# Patient Record
Sex: MALE | Race: WHITE | NOT HISPANIC OR LATINO | Employment: FULL TIME | URBAN - METROPOLITAN AREA
[De-identification: names, ages, dates, MRNs, and addresses within clinical notes are randomized per-mention and may not be internally consistent; named-entity substitution may affect disease eponyms.]

---

## 2021-04-08 DIAGNOSIS — Z23 ENCOUNTER FOR IMMUNIZATION: ICD-10-CM

## 2021-10-28 ENCOUNTER — OFFICE VISIT (OUTPATIENT)
Dept: FAMILY MEDICINE CLINIC | Facility: CLINIC | Age: 36
End: 2021-10-28
Payer: COMMERCIAL

## 2021-10-28 VITALS
SYSTOLIC BLOOD PRESSURE: 140 MMHG | DIASTOLIC BLOOD PRESSURE: 80 MMHG | HEART RATE: 81 BPM | TEMPERATURE: 98.7 F | BODY MASS INDEX: 37.47 KG/M2 | OXYGEN SATURATION: 96 % | HEIGHT: 69 IN | WEIGHT: 253 LBS

## 2021-10-28 DIAGNOSIS — I10 PRIMARY HYPERTENSION: ICD-10-CM

## 2021-10-28 DIAGNOSIS — Z23 NEED FOR INFLUENZA VACCINATION: ICD-10-CM

## 2021-10-28 DIAGNOSIS — Z00.00 ANNUAL PHYSICAL EXAM: Primary | ICD-10-CM

## 2021-10-28 PROCEDURE — 1036F TOBACCO NON-USER: CPT | Performed by: FAMILY MEDICINE

## 2021-10-28 PROCEDURE — 90471 IMMUNIZATION ADMIN: CPT

## 2021-10-28 PROCEDURE — 3725F SCREEN DEPRESSION PERFORMED: CPT | Performed by: FAMILY MEDICINE

## 2021-10-28 PROCEDURE — 90686 IIV4 VACC NO PRSV 0.5 ML IM: CPT

## 2021-10-28 PROCEDURE — 3008F BODY MASS INDEX DOCD: CPT | Performed by: FAMILY MEDICINE

## 2021-10-28 PROCEDURE — 99385 PREV VISIT NEW AGE 18-39: CPT | Performed by: FAMILY MEDICINE

## 2021-10-28 RX ORDER — FLUTICASONE PROPIONATE 50 MCG
1 SPRAY, SUSPENSION (ML) NASAL AS NEEDED
COMMUNITY

## 2021-10-28 RX ORDER — QUINAPRIL 20 MG/1
20 TABLET ORAL DAILY
COMMUNITY
Start: 2021-08-12 | End: 2021-10-28

## 2021-10-28 RX ORDER — OLMESARTAN MEDOXOMIL 20 MG/1
20 TABLET ORAL DAILY
Qty: 90 TABLET | Refills: 0 | Status: SHIPPED | OUTPATIENT
Start: 2021-10-28 | End: 2022-01-16

## 2021-11-05 ENCOUNTER — TELEPHONE (OUTPATIENT)
Dept: OTHER | Facility: OTHER | Age: 36
End: 2021-11-05

## 2021-11-17 ENCOUNTER — HOSPITAL ENCOUNTER (OUTPATIENT)
Dept: RADIOLOGY | Facility: HOSPITAL | Age: 36
Discharge: HOME | End: 2021-11-17
Payer: COMMERCIAL

## 2021-11-17 DIAGNOSIS — R74.8 ELEVATED LIVER ENZYMES: ICD-10-CM

## 2021-11-17 PROCEDURE — 76700 US EXAM ABDOM COMPLETE: CPT

## 2021-12-22 ENCOUNTER — IMMUNIZATIONS (OUTPATIENT)
Dept: FAMILY MEDICINE CLINIC | Facility: HOSPITAL | Age: 36
End: 2021-12-22

## 2021-12-22 DIAGNOSIS — Z23 ENCOUNTER FOR IMMUNIZATION: Primary | ICD-10-CM

## 2021-12-22 PROCEDURE — 0064A COVID-19 MODERNA VACC 0.25 ML BOOSTER: CPT

## 2021-12-22 PROCEDURE — 91306 COVID-19 MODERNA VACC 0.25 ML BOOSTER: CPT

## 2022-01-15 DIAGNOSIS — I10 PRIMARY HYPERTENSION: ICD-10-CM

## 2022-01-16 RX ORDER — OLMESARTAN MEDOXOMIL 20 MG/1
TABLET ORAL
Qty: 90 TABLET | Refills: 0 | Status: SHIPPED | OUTPATIENT
Start: 2022-01-16 | End: 2022-04-09

## 2022-01-25 ENCOUNTER — RA CDI HCC (OUTPATIENT)
Dept: OTHER | Facility: HOSPITAL | Age: 37
End: 2022-01-25

## 2022-01-25 NOTE — PROGRESS NOTES
[address]  coding opportunities      Chart Reviewed * (Number of) Inbasket suggestions sent to Provider: 1     Problem listed updated  Provider Accepted, (number of) suggestions accepted: 1            Number of suggestions NOT actually used: 1     Patients insurance company: [employer] (Medicare Advantage and Commercial)     Visit status: Patient arrived for their scheduled appointment        [address]  coding opportunities      Chart Reviewed * (Number of) Inbasket suggestions sent to Provider: 1     Problem listed updated  Provider Accepted, (number of) suggestions accepted: 1            Patients insurance company: [employer] (Medicare Advantage and Commercial)       [facility] [address]  coding opportunities      Chart Reviewed * (Number of) Inbasket suggestions sent to Provider: 1      Patients insurance company: [employer] (Medicare Advantage and Commercial)       Appt on 1/28/22    Please review if this is correct and assess and document if applicable       E66 01: Morbid (severe) obesity due to excess calories (HCC)     Per CMS/ICD 10 coding guidelines, to be used when BMI > 35 & <40 with one or more comorbidity (DM, HTN, or Obstructive Sleep Apnea)

## 2022-01-26 ENCOUNTER — APPOINTMENT (OUTPATIENT)
Dept: LAB | Age: 37
End: 2022-01-26
Payer: COMMERCIAL

## 2022-01-26 DIAGNOSIS — I10 PRIMARY HYPERTENSION: ICD-10-CM

## 2022-01-26 DIAGNOSIS — Z00.00 ANNUAL PHYSICAL EXAM: ICD-10-CM

## 2022-01-26 LAB
ALBUMIN SERPL BCP-MCNC: 4.2 G/DL (ref 3.5–5)
ALP SERPL-CCNC: 103 U/L (ref 46–116)
ALT SERPL W P-5'-P-CCNC: 90 U/L (ref 12–78)
ANION GAP SERPL CALCULATED.3IONS-SCNC: 3 MMOL/L (ref 4–13)
AST SERPL W P-5'-P-CCNC: 41 U/L (ref 5–45)
BASOPHILS # BLD AUTO: 0.05 THOUSANDS/ΜL (ref 0–0.1)
BASOPHILS NFR BLD AUTO: 1 % (ref 0–1)
BILIRUB SERPL-MCNC: 0.68 MG/DL (ref 0.2–1)
BUN SERPL-MCNC: 19 MG/DL (ref 5–25)
CALCIUM SERPL-MCNC: 9.7 MG/DL (ref 8.3–10.1)
CHLORIDE SERPL-SCNC: 108 MMOL/L (ref 100–108)
CHOLEST SERPL-MCNC: 160 MG/DL
CO2 SERPL-SCNC: 27 MMOL/L (ref 21–32)
CREAT SERPL-MCNC: 1.14 MG/DL (ref 0.6–1.3)
EOSINOPHIL # BLD AUTO: 0.02 THOUSAND/ΜL (ref 0–0.61)
EOSINOPHIL NFR BLD AUTO: 0 % (ref 0–6)
ERYTHROCYTE [DISTWIDTH] IN BLOOD BY AUTOMATED COUNT: 12.7 % (ref 11.6–15.1)
GFR SERPL CREATININE-BSD FRML MDRD: 83 ML/MIN/1.73SQ M
GLUCOSE P FAST SERPL-MCNC: 119 MG/DL (ref 65–99)
HCT VFR BLD AUTO: 44.9 % (ref 36.5–49.3)
HDLC SERPL-MCNC: 34 MG/DL
HGB BLD-MCNC: 15.5 G/DL (ref 12–17)
IMM GRANULOCYTES # BLD AUTO: 0.01 THOUSAND/UL (ref 0–0.2)
IMM GRANULOCYTES NFR BLD AUTO: 0 % (ref 0–2)
LDLC SERPL CALC-MCNC: 104 MG/DL (ref 0–100)
LYMPHOCYTES # BLD AUTO: 2.02 THOUSANDS/ΜL (ref 0.6–4.47)
LYMPHOCYTES NFR BLD AUTO: 33 % (ref 14–44)
MCH RBC QN AUTO: 30 PG (ref 26.8–34.3)
MCHC RBC AUTO-ENTMCNC: 34.5 G/DL (ref 31.4–37.4)
MCV RBC AUTO: 87 FL (ref 82–98)
MONOCYTES # BLD AUTO: 0.76 THOUSAND/ΜL (ref 0.17–1.22)
MONOCYTES NFR BLD AUTO: 13 % (ref 4–12)
NEUTROPHILS # BLD AUTO: 3.18 THOUSANDS/ΜL (ref 1.85–7.62)
NEUTS SEG NFR BLD AUTO: 53 % (ref 43–75)
NRBC BLD AUTO-RTO: 0 /100 WBCS
PLATELET # BLD AUTO: 269 THOUSANDS/UL (ref 149–390)
PMV BLD AUTO: 9.1 FL (ref 8.9–12.7)
POTASSIUM SERPL-SCNC: 4.7 MMOL/L (ref 3.5–5.3)
PROT SERPL-MCNC: 7.7 G/DL (ref 6.4–8.2)
RBC # BLD AUTO: 5.16 MILLION/UL (ref 3.88–5.62)
SODIUM SERPL-SCNC: 138 MMOL/L (ref 136–145)
TRIGL SERPL-MCNC: 109 MG/DL
WBC # BLD AUTO: 6.04 THOUSAND/UL (ref 4.31–10.16)

## 2022-01-26 PROCEDURE — 85025 COMPLETE CBC W/AUTO DIFF WBC: CPT | Performed by: FAMILY MEDICINE

## 2022-01-26 PROCEDURE — 36415 COLL VENOUS BLD VENIPUNCTURE: CPT | Performed by: FAMILY MEDICINE

## 2022-01-26 PROCEDURE — 80061 LIPID PANEL: CPT

## 2022-01-26 PROCEDURE — 87389 HIV-1 AG W/HIV-1&-2 AB AG IA: CPT

## 2022-01-26 PROCEDURE — 80053 COMPREHEN METABOLIC PANEL: CPT | Performed by: FAMILY MEDICINE

## 2022-01-27 LAB — HIV 1+2 AB+HIV1 P24 AG SERPL QL IA: NORMAL

## 2022-01-28 ENCOUNTER — OFFICE VISIT (OUTPATIENT)
Dept: FAMILY MEDICINE CLINIC | Facility: CLINIC | Age: 37
End: 2022-01-28
Payer: COMMERCIAL

## 2022-01-28 VITALS
RESPIRATION RATE: 16 BRPM | HEART RATE: 88 BPM | OXYGEN SATURATION: 97 % | SYSTOLIC BLOOD PRESSURE: 118 MMHG | BODY MASS INDEX: 37.98 KG/M2 | WEIGHT: 256.4 LBS | TEMPERATURE: 97.2 F | DIASTOLIC BLOOD PRESSURE: 80 MMHG | HEIGHT: 69 IN

## 2022-01-28 DIAGNOSIS — I10 PRIMARY HYPERTENSION: Primary | ICD-10-CM

## 2022-01-28 DIAGNOSIS — J30.2 SEASONAL ALLERGIES: ICD-10-CM

## 2022-01-28 DIAGNOSIS — E66.9 OBESITY (BMI 30-39.9): ICD-10-CM

## 2022-01-28 PROBLEM — E66.01 MORBID (SEVERE) OBESITY DUE TO EXCESS CALORIES (HCC): Status: ACTIVE | Noted: 2022-01-28

## 2022-01-28 PROCEDURE — 3725F SCREEN DEPRESSION PERFORMED: CPT | Performed by: FAMILY MEDICINE

## 2022-01-28 PROCEDURE — 3008F BODY MASS INDEX DOCD: CPT | Performed by: FAMILY MEDICINE

## 2022-01-28 PROCEDURE — 3079F DIAST BP 80-89 MM HG: CPT | Performed by: FAMILY MEDICINE

## 2022-01-28 PROCEDURE — 99214 OFFICE O/P EST MOD 30 MIN: CPT | Performed by: FAMILY MEDICINE

## 2022-01-28 PROCEDURE — 3074F SYST BP LT 130 MM HG: CPT | Performed by: FAMILY MEDICINE

## 2022-01-28 PROCEDURE — 1036F TOBACCO NON-USER: CPT | Performed by: FAMILY MEDICINE

## 2022-01-28 NOTE — PROGRESS NOTES
Assessment/Plan:    Seasonal allergies  Stable on flonase PRN     Primary hypertension  Doing well on benicar  Continue as directed    Obesity (BMI 30-39  9)  Diet and exercise discussed  5 pounds weight loss goal in 4 months       Diagnoses and all orders for this visit:    Primary hypertension  -     Comprehensive metabolic panel    Obesity (BMI 30-39  9)    Seasonal allergies          Subjective:      Patient ID: Jacqlyn Juarez is a 31 y o  male  Here for follow up  Gained few pounds    Breakfast: bagel, cottage cheese  Lunch: sandwich - ham, turkey, cheese   Dinner: meat and vegetables      Hypertension  This is a chronic problem  The current episode started more than 1 year ago  The problem has been gradually improving since onset  The problem is controlled  Pertinent negatives include no anxiety, blurred vision, chest pain, headaches, malaise/fatigue, neck pain, orthopnea, palpitations, peripheral edema, PND, shortness of breath or sweats  Past treatments include angiotensin blockers  The current treatment provides moderate improvement  There are no compliance problems  There is no history of angina, kidney disease, CAD/MI, CVA, heart failure, left ventricular hypertrophy, PVD or retinopathy  There is no history of chronic renal disease, coarctation of the aorta, hyperaldosteronism, hypercortisolism, hyperparathyroidism, a hypertension causing med, pheochromocytoma, renovascular disease, sleep apnea or a thyroid problem  The following portions of the patient's history were reviewed and updated as appropriate: allergies, current medications, past family history, past medical history, past social history, past surgical history and problem list     Review of Systems   Constitutional: Negative  Negative for malaise/fatigue  HENT: Negative  Eyes: Negative  Negative for blurred vision  Respiratory: Negative  Negative for shortness of breath      Cardiovascular: Negative for chest pain, palpitations, orthopnea and PND  Genitourinary: Negative  Musculoskeletal: Negative  Negative for neck pain  Neurological: Negative  Negative for headaches  Hematological: Negative  Objective:      /80 (BP Location: Left arm, Patient Position: Sitting, Cuff Size: Large)   Pulse 88   Temp (!) 97 2 °F (36 2 °C) (Tympanic)   Resp 16   Ht 5' 8 5" (1 74 m)   Wt 116 kg (256 lb 6 4 oz)   SpO2 97%   BMI 38 42 kg/m²          Physical Exam  Vitals reviewed  Constitutional:       Appearance: Normal appearance  Cardiovascular:      Rate and Rhythm: Normal rate  Pulses: Normal pulses  Heart sounds: Normal heart sounds  Neurological:      General: No focal deficit present  Mental Status: He is alert and oriented to person, place, and time     Psychiatric:         Mood and Affect: Mood normal          Behavior: Behavior normal

## 2022-04-09 DIAGNOSIS — I10 PRIMARY HYPERTENSION: ICD-10-CM

## 2022-04-09 RX ORDER — OLMESARTAN MEDOXOMIL 20 MG/1
TABLET ORAL
Qty: 90 TABLET | Refills: 0 | Status: SHIPPED | OUTPATIENT
Start: 2022-04-09 | End: 2022-07-12 | Stop reason: SDUPTHER

## 2022-06-06 ENCOUNTER — OFFICE VISIT (OUTPATIENT)
Dept: FAMILY MEDICINE CLINIC | Facility: CLINIC | Age: 37
End: 2022-06-06
Payer: COMMERCIAL

## 2022-06-06 VITALS
DIASTOLIC BLOOD PRESSURE: 74 MMHG | HEART RATE: 80 BPM | HEIGHT: 69 IN | RESPIRATION RATE: 16 BRPM | WEIGHT: 251.6 LBS | SYSTOLIC BLOOD PRESSURE: 118 MMHG | BODY MASS INDEX: 37.26 KG/M2 | OXYGEN SATURATION: 95 % | TEMPERATURE: 98.5 F

## 2022-06-06 DIAGNOSIS — J30.2 SEASONAL ALLERGIES: ICD-10-CM

## 2022-06-06 DIAGNOSIS — Z23 NEED FOR DIPHTHERIA-TETANUS-PERTUSSIS (TDAP) VACCINE: ICD-10-CM

## 2022-06-06 DIAGNOSIS — E66.01 MORBID (SEVERE) OBESITY DUE TO EXCESS CALORIES (HCC): Primary | ICD-10-CM

## 2022-06-06 DIAGNOSIS — R74.8 ELEVATED LIVER ENZYMES: ICD-10-CM

## 2022-06-06 PROCEDURE — 99214 OFFICE O/P EST MOD 30 MIN: CPT | Performed by: FAMILY MEDICINE

## 2022-06-06 PROCEDURE — 90471 IMMUNIZATION ADMIN: CPT

## 2022-06-06 PROCEDURE — 1036F TOBACCO NON-USER: CPT | Performed by: FAMILY MEDICINE

## 2022-06-06 PROCEDURE — 3074F SYST BP LT 130 MM HG: CPT | Performed by: FAMILY MEDICINE

## 2022-06-06 PROCEDURE — 90715 TDAP VACCINE 7 YRS/> IM: CPT

## 2022-06-06 PROCEDURE — 3008F BODY MASS INDEX DOCD: CPT | Performed by: FAMILY MEDICINE

## 2022-06-06 PROCEDURE — 3078F DIAST BP <80 MM HG: CPT | Performed by: FAMILY MEDICINE

## 2022-06-06 NOTE — PROGRESS NOTES
Assessment/Plan: Morbid (severe) obesity due to excess calories ([address] )  Diet and exercise encouraged    Seasonal allergies  Doing well over zyrtec as needed       Elevated liver enzymes  Reviewed [facility] on his phone today as I dont have labs available  AST is 40, ALT 65  Went down from prior labs  Weight loss encouraged to help with fatty liver shown on ultrasound     Primary hypertension  Doing well on benicar       Diagnoses and all orders for this visit:    Morbid (severe) obesity due to excess calories (HCC)    Seasonal allergies    Need for diphtheria-tetanus-pertussis (Tdap) vaccine  -     TDAP VACCINE GREATER THAN OR EQUAL TO 6YO IM    Elevated liver enzymes        Subjective:      Patient ID: Ramon Contreras is a 31 y o  male  Had blood work few days ago    Hypertension  This is a chronic problem  The current episode started more than 1 year ago  The problem is controlled  Pertinent negatives include no anxiety, blurred vision, chest pain, headaches, malaise/fatigue, neck pain, orthopnea, palpitations, peripheral edema, PND, shortness of breath or sweats  The current treatment provides mild improvement  The following portions of the patient's history were reviewed and updated as appropriate: allergies, current medications, past family history, past medical history, past social history, past surgical history and problem list     Review of Systems   Constitutional: Negative for malaise/fatigue  Eyes: Negative for blurred vision  Respiratory: Negative for shortness of breath  Cardiovascular: Negative for chest pain, palpitations, orthopnea and PND  Musculoskeletal: Negative for neck pain  Neurological: Negative for headaches           Objective:      /74 (BP Location: Left arm, Patient Position: Sitting, Cuff Size: Large)   Pulse 80   Temp 98 5 °F (36 9 °C) (Tympanic)   Resp 16   Ht 5' 8 5" (1 74 m)   Wt 114 kg (251 lb 9 6 oz)   SpO2 95%   BMI 37 70 kg/m²          Physical Exam  Constitutional:       Appearance: Normal appearance  Cardiovascular:      Rate and Rhythm: Normal rate and regular rhythm  Pulses: Normal pulses  Heart sounds: Normal heart sounds  Pulmonary:      Effort: Pulmonary effort is normal       Breath sounds: Normal breath sounds  Neurological:      Mental Status: He is alert  BMI Counseling: Body mass index is 37 7 kg/m²  The BMI is above normal  Nutrition recommendations include decreasing portion sizes and encouraging healthy choices of fruits and vegetables  Exercise recommendations include moderate physical activity 150 minutes/week  No pharmacotherapy was ordered  Rationale for BMI follow-up plan is due to patient being overweight or obese

## 2022-06-06 NOTE — ASSESSMENT & PLAN NOTE
Reviewed [facility] on his phone today as I dont have labs available  AST is 40, ALT 65  Went down from prior labs  Weight loss encouraged to help with fatty liver shown on ultrasound

## 2022-10-21 DIAGNOSIS — I10 PRIMARY HYPERTENSION: ICD-10-CM

## 2022-10-21 DIAGNOSIS — R74.8 ELEVATED LIVER ENZYMES: Primary | ICD-10-CM

## 2022-10-31 ENCOUNTER — OFFICE VISIT (OUTPATIENT)
Dept: FAMILY MEDICINE CLINIC | Facility: CLINIC | Age: 37
End: 2022-10-31

## 2022-10-31 VITALS
TEMPERATURE: 98.4 F | BODY MASS INDEX: 38.52 KG/M2 | WEIGHT: 254.2 LBS | DIASTOLIC BLOOD PRESSURE: 74 MMHG | HEART RATE: 74 BPM | RESPIRATION RATE: 16 BRPM | OXYGEN SATURATION: 96 % | HEIGHT: 68 IN | SYSTOLIC BLOOD PRESSURE: 112 MMHG

## 2022-10-31 DIAGNOSIS — R74.8 ELEVATED LIVER ENZYMES: ICD-10-CM

## 2022-10-31 DIAGNOSIS — I10 PRIMARY HYPERTENSION: ICD-10-CM

## 2022-10-31 DIAGNOSIS — Z00.00 ANNUAL PHYSICAL EXAM: Primary | ICD-10-CM

## 2022-10-31 DIAGNOSIS — E66.01 MORBID (SEVERE) OBESITY DUE TO EXCESS CALORIES (HCC): ICD-10-CM

## 2022-10-31 DIAGNOSIS — Z23 ENCOUNTER FOR IMMUNIZATION: ICD-10-CM

## 2022-10-31 NOTE — PROGRESS NOTES
[facility] PRACTICE    NAME: Collette Zavala  AGE: 30 y o  SEX: male  : 1985     DATE: 10/31/2022     Assessment and Plan:     Problem List Items Addressed This Visit        Cardiovascular and Mediastinum    Primary hypertension     benicar 20 mg daily is helping             Other    Morbid (severe) obesity due to excess calories ([address] )     Weight loss management program discussed today   Patient is not interested yet   Spoke about saxenda injections as well and patient will check with his insurance         Elevated liver enzymes     Recent AST and ALT were still elevated done with [facility]- reviewed on his phone as results werent faxed to me yet  Weight loss will help with fatty liver           Other Visit Diagnoses     Annual physical exam    -  Primary    Encounter for immunization        Relevant Orders    influenza vaccine, quadrivalent, 0 5 mL, preservative-free, for adult and pediatric patients 6 mos+ (AFLURIA, FLUARIX, FLULAVAL, FLUZONE) (Completed)          Immunizations and preventive care screenings were discussed with patient today  Appropriate education was printed on patient's after visit summary  Counseling:  • Alcohol/drug use: discussed moderation in alcohol intake, the recommendations for healthy alcohol use, and avoidance of illicit drug use  • Dental Health: discussed importance of regular tooth brushing, flossing, and dental visits  • Injury prevention: discussed safety/seat belts, safety helmets, smoke detectors, carbon dioxide detectors, and smoking near bedding or upholstery  • Sexual health: discussed sexually transmitted diseases, partner selection, use of condoms, avoidance of unintended pregnancy, and contraceptive alternatives  · Exercise: the importance of regular exercise/physical activity was discussed  Recommend exercise 3-5 times per week for at least 30 minutes         Depression Screening and Follow-up Plan: Patient was screened for depression during today's encounter  They screened negative with a PHQ-2 score of 0  No follow-ups on file  Chief Complaint:     Chief Complaint   Patient presents with   • Physical Exam     Patient is here today for an annual exam       History of Present Illness:     Adult Annual Physical   Patient here for a comprehensive physical exam  The patient reports no problems  Diet and Physical Activity  · Diet/Nutrition: well balanced diet and consuming 3-5 servings of fruits/vegetables daily  · Exercise: walking  Depression Screening  PHQ-2/9 Depression Screening    Little interest or pleasure in doing things: 0 - not at all  Feeling down, depressed, or hopeless: 0 - not at all  PHQ-2 Score: 0  PHQ-2 Interpretation: Negative depression screen       General Health  · Sleep: sleeps well and gets 7-8 hours of sleep on average  · Hearing: normal - bilateral   · Vision: goes for regular eye exams  · Dental: regular dental visits and brushes teeth twice daily   Health  · History of STDs?: no      Review of Systems:     Review of Systems   Constitutional: Negative  HENT: Negative  Eyes: Negative  Respiratory: Negative  Cardiovascular: Negative  Gastrointestinal: Negative  Endocrine: Negative  Genitourinary: Negative  Musculoskeletal: Negative  Skin: Negative  Allergic/Immunologic: Negative  Neurological: Negative  Hematological: Negative  Psychiatric/Behavioral: Negative  Past Medical History:     Past Medical History:   Diagnosis Date   • Hypertension       Past Surgical History:     History reviewed  No pertinent surgical history     Social History:     Social History     Socioeconomic History   • Marital status: Single     Spouse name: None   • Number of children: None   • Years of education: None   • Highest education level: None   Occupational History   • None   Tobacco Use   • Smoking status: Never Smoker   • Smokeless tobacco: Never Used   Vaping Use   • Vaping Use: Never used   Substance and Sexual Activity   • Alcohol use: Yes   • Drug use: Never   • Sexual activity: Yes   Other Topics Concern   • None   Social History Narrative   • None     Social Determinants of Health     Financial Resource Strain: Not on file   Food Insecurity: Not on file   Transportation Needs: Not on file   Physical Activity: Not on file   Stress: Not on file   Social Connections: Not on file   Intimate Partner Violence: Not on file   Housing Stability: Not on file      Family History:     Family History   Problem Relation Age of Onset   • Hypertension Father       Current Medications:     Current Outpatient Medications   Medication Sig Dispense Refill   • fluticasone (FLONASE) 50 mcg/act nasal spray 1 spray into each nostril as needed for rhinitis     • olmesartan (BENICAR) 20 mg tablet Take 1 tablet (20 mg total) by mouth daily 90 tablet 3     No current facility-administered medications for this visit  Allergies:     No Known Allergies   Physical Exam:     /74 (BP Location: Left arm, Patient Position: Sitting, Cuff Size: Large)   Pulse 74   Temp 98 4 °F (36 9 °C) (Tympanic)   Resp 16   Ht 5' 8 47" (1 739 m)   Wt 115 kg (254 lb 3 2 oz)   SpO2 96%   BMI 38 13 kg/m²     Physical Exam  Constitutional:       Appearance: He is well-developed  HENT:      Head: Normocephalic and atraumatic  Right Ear: Tympanic membrane normal       Left Ear: Tympanic membrane normal    Eyes:      Pupils: Pupils are equal, round, and reactive to light  Cardiovascular:      Rate and Rhythm: Normal rate and regular rhythm  Pulmonary:      Effort: Pulmonary effort is normal       Breath sounds: Normal breath sounds  Abdominal:      Palpations: Abdomen is soft  Musculoskeletal:         General: Normal range of motion  Cervical back: Normal range of motion and neck supple  Skin:     General: Skin is warm        Capillary Refill: Capillary refill takes less than 2 seconds  Neurological:      Mental Status: He is alert and oriented to person, place, and time     Psychiatric:         Behavior: Behavior normal           Tyler Yadav MD   [facility]

## 2022-10-31 NOTE — PATIENT INSTRUCTIONS

## 2022-10-31 NOTE — ASSESSMENT & PLAN NOTE
Recent AST and ALT were still elevated done with [facility]- reviewed on his phone as results werent faxed to me yet  Weight loss will help with fatty liver

## 2022-10-31 NOTE — ASSESSMENT & PLAN NOTE
Weight loss management program discussed today   Patient is not interested yet   Spoke about saxenda injections as well and patient will check with his insurance

## 2022-11-03 ENCOUNTER — TELEPHONE (OUTPATIENT)
Dept: FAMILY MEDICINE CLINIC | Facility: CLINIC | Age: 37
End: 2022-11-03

## 2022-11-03 NOTE — TELEPHONE ENCOUNTER
Lm about labs at [address] [place] [address] and results not coming into chart  Should be resolved at this time, call the office if issue continues after next lab tests completed

## 2023-04-26 LAB
CHOLEST SERPL-MCNC: 150 MG/DL (ref 100–199)
HDLC SERPL-MCNC: 33 MG/DL
LDLC SERPL CALC-MCNC: 90 MG/DL (ref 0–99)
SL AMB VLDL CHOLESTEROL CALC: 27 MG/DL (ref 5–40)
TRIGL SERPL-MCNC: 150 MG/DL (ref 0–149)

## 2023-04-27 ENCOUNTER — OFFICE VISIT (OUTPATIENT)
Dept: FAMILY MEDICINE CLINIC | Facility: CLINIC | Age: 38
End: 2023-04-27

## 2023-04-27 VITALS
BODY MASS INDEX: 38.34 KG/M2 | WEIGHT: 253 LBS | SYSTOLIC BLOOD PRESSURE: 112 MMHG | OXYGEN SATURATION: 98 % | HEART RATE: 80 BPM | HEIGHT: 68 IN | TEMPERATURE: 97.3 F | DIASTOLIC BLOOD PRESSURE: 78 MMHG

## 2023-04-27 DIAGNOSIS — I10 PRIMARY HYPERTENSION: ICD-10-CM

## 2023-04-27 DIAGNOSIS — J30.2 SEASONAL ALLERGIES: ICD-10-CM

## 2023-04-27 DIAGNOSIS — R74.8 ELEVATED LIVER ENZYMES: ICD-10-CM

## 2023-04-27 DIAGNOSIS — E66.01 MORBID (SEVERE) OBESITY DUE TO EXCESS CALORIES (HCC): Primary | ICD-10-CM

## 2023-04-27 RX ORDER — OLMESARTAN MEDOXOMIL 20 MG/1
20 TABLET ORAL DAILY
Qty: 90 TABLET | Refills: 3 | Status: SHIPPED | OUTPATIENT
Start: 2023-04-27

## 2023-04-27 NOTE — PROGRESS NOTES
"Name: Danilo Galloway      : 1985      MRN: [id number]  Encounter Provider: Vidya Palafox MD  Encounter Date: 2023   Encounter department: [facility]     1  Morbid (severe) obesity due to excess calories [facility])  Assessment & Plan:  Diet, exercise  My fitness pal  Calories goal of 1800 milady/day  Recheck in 6 months    Orders:  -     Ambulatory Referral to Nutrition Services; Future  -     Ambulatory Referral to Nutrition Services; Future    2  Elevated liver enzymes  Assessment & Plan:  Due for labs       3  Seasonal allergies  Assessment & Plan:  No need for meds this year yet        4  Primary hypertension  Assessment & Plan:  Doing well on current meds    Orders:  -     olmesartan (BENICAR) 20 mg tablet; Take 1 tablet (20 mg total) by mouth daily  -     CBC and differential  -     Comprehensive metabolic panel  -     Lipid panel           Subjective      HPI   Here for follow up  Feeling well overall  Not watching his diet  Weigh the same as last visit      Review of Systems   Constitutional: Negative  HENT: Negative  Eyes: Negative  Respiratory: Negative  Endocrine: Negative  Genitourinary: Negative  Musculoskeletal: Negative  Allergic/Immunologic: Negative  Neurological: Negative  Hematological: Negative  Psychiatric/Behavioral: Negative  Current Outpatient Medications on File Prior to Visit   Medication Sig   • [DISCONTINUED] olmesartan (BENICAR) 20 mg tablet Take 1 tablet (20 mg total) by mouth daily   • [DISCONTINUED] fluticasone (FLONASE) 50 mcg/act nasal spray 1 spray into each nostril as needed for rhinitis       Objective     /78 (BP Location: Left arm, Patient Position: Sitting, Cuff Size: Large)   Pulse 80   Temp (!) 97 3 °F (36 3 °C) (Temporal)   Ht 5' 8\" (1 727 m)   Wt 115 kg (253 lb)   SpO2 98%   BMI 38 47 kg/m²     Physical Exam  Constitutional:       Appearance: Normal appearance     HENT:      Right " Ear: Tympanic membrane normal       Left Ear: Tympanic membrane normal    Pulmonary:      Effort: Pulmonary effort is normal       Breath sounds: Normal breath sounds  Neurological:      General: No focal deficit present  Mental Status: He is alert and oriented to person, place, and time     Psychiatric:         Mood and Affect: Mood normal          Behavior: Behavior normal        Rey Singh MD

## 2023-11-01 ENCOUNTER — OFFICE VISIT (OUTPATIENT)
Dept: FAMILY MEDICINE CLINIC | Facility: CLINIC | Age: 38
End: 2023-11-01
Payer: COMMERCIAL

## 2023-11-01 VITALS
OXYGEN SATURATION: 95 % | HEIGHT: 69 IN | TEMPERATURE: 97.3 F | DIASTOLIC BLOOD PRESSURE: 86 MMHG | SYSTOLIC BLOOD PRESSURE: 122 MMHG | BODY MASS INDEX: 36.88 KG/M2 | RESPIRATION RATE: 17 BRPM | HEART RATE: 72 BPM | WEIGHT: 249 LBS

## 2023-11-01 DIAGNOSIS — Z11.59 NEED FOR HEPATITIS C SCREENING TEST: ICD-10-CM

## 2023-11-01 DIAGNOSIS — R74.8 ELEVATED LIVER ENZYMES: ICD-10-CM

## 2023-11-01 DIAGNOSIS — K21.9 GASTROESOPHAGEAL REFLUX DISEASE WITHOUT ESOPHAGITIS: ICD-10-CM

## 2023-11-01 DIAGNOSIS — Z00.00 ANNUAL PHYSICAL EXAM: Primary | ICD-10-CM

## 2023-11-01 DIAGNOSIS — I10 PRIMARY HYPERTENSION: ICD-10-CM

## 2023-11-01 LAB
ALBUMIN SERPL-MCNC: 4.6 G/DL (ref 4.1–5.1)
ALBUMIN/GLOB SERPL: 2 {RATIO} (ref 1.2–2.2)
ALP SERPL-CCNC: 94 IU/L (ref 44–121)
ALT SERPL-CCNC: 51 IU/L (ref 0–44)
AST SERPL-CCNC: 35 IU/L (ref 0–40)
BASOPHILS # BLD AUTO: 0.1 X10E3/UL (ref 0–0.2)
BASOPHILS NFR BLD AUTO: 1 %
BILIRUB SERPL-MCNC: 0.7 MG/DL (ref 0–1.2)
BUN SERPL-MCNC: 14 MG/DL (ref 6–20)
BUN/CREAT SERPL: 13 (ref 9–20)
CALCIUM SERPL-MCNC: 9.5 MG/DL (ref 8.7–10.2)
CHLORIDE SERPL-SCNC: 101 MMOL/L (ref 96–106)
CHOLEST SERPL-MCNC: 164 MG/DL (ref 100–199)
CO2 SERPL-SCNC: 22 MMOL/L (ref 20–29)
CREAT SERPL-MCNC: 1.1 MG/DL (ref 0.76–1.27)
EGFR: 89 ML/MIN/1.73
EOSINOPHIL # BLD AUTO: 0.1 X10E3/UL (ref 0–0.4)
EOSINOPHIL NFR BLD AUTO: 2 %
ERYTHROCYTE [DISTWIDTH] IN BLOOD BY AUTOMATED COUNT: 12.5 % (ref 11.6–15.4)
GLOBULIN SER-MCNC: 2.3 G/DL (ref 1.5–4.5)
GLUCOSE SERPL-MCNC: 106 MG/DL (ref 70–99)
HCT VFR BLD AUTO: 42.7 % (ref 37.5–51)
HDLC SERPL-MCNC: 37 MG/DL
HGB BLD-MCNC: 14.9 G/DL (ref 13–17.7)
IMM GRANULOCYTES # BLD: 0 X10E3/UL (ref 0–0.1)
IMM GRANULOCYTES NFR BLD: 0 %
LDLC SERPL CALC-MCNC: 102 MG/DL (ref 0–99)
LYMPHOCYTES # BLD AUTO: 2.1 X10E3/UL (ref 0.7–3.1)
LYMPHOCYTES NFR BLD AUTO: 36 %
MCH RBC QN AUTO: 30.6 PG (ref 26.6–33)
MCHC RBC AUTO-ENTMCNC: 34.9 G/DL (ref 31.5–35.7)
MCV RBC AUTO: 88 FL (ref 79–97)
MONOCYTES # BLD AUTO: 0.7 X10E3/UL (ref 0.1–0.9)
MONOCYTES NFR BLD AUTO: 12 %
NEUTROPHILS # BLD AUTO: 2.9 X10E3/UL (ref 1.4–7)
NEUTROPHILS NFR BLD AUTO: 49 %
PLATELET # BLD AUTO: 263 X10E3/UL (ref 150–450)
POTASSIUM SERPL-SCNC: 5.1 MMOL/L (ref 3.5–5.2)
PROT SERPL-MCNC: 6.9 G/DL (ref 6–8.5)
RBC # BLD AUTO: 4.87 X10E6/UL (ref 4.14–5.8)
SL AMB VLDL CHOLESTEROL CALC: 25 MG/DL (ref 5–40)
SODIUM SERPL-SCNC: 137 MMOL/L (ref 134–144)
TRIGL SERPL-MCNC: 141 MG/DL (ref 0–149)
WBC # BLD AUTO: 5.8 X10E3/UL (ref 3.4–10.8)

## 2023-11-01 PROCEDURE — 99395 PREV VISIT EST AGE 18-39: CPT | Performed by: FAMILY MEDICINE

## 2023-11-01 NOTE — PROGRESS NOTES
[facility] PRACTICE    NAME: Dominick Wells  AGE: 41 y.o. SEX: male  : 1985     DATE: 2023     Assessment and Plan:     Problem List Items Addressed This Visit          Digestive    Gastroesophageal reflux disease without esophagitis     to try prilosec or nexium 20 mg OTC for 2 weeks  to avoid triggers  weight loss encouraged as well            Cardiovascular and Mediastinum    Primary hypertension    Relevant Orders    Comprehensive metabolic panel    CBC and differential    Lipid panel       Other    Elevated liver enzymes     Improving  Continue to lose weight and avoid tylenol containing products and alcohol          Other Visit Diagnoses       Annual physical exam    -  Primary    Relevant Orders    Lipid panel    Need for hepatitis C screening test        Relevant Orders    Hepatitis C antibody            Immunizations and preventive care screenings were discussed with patient today. Appropriate education was printed on patient's after visit summary. Counseling:  Alcohol/drug use: discussed moderation in alcohol intake, the recommendations for healthy alcohol use, and avoidance of illicit drug use. Dental Health: discussed importance of regular tooth brushing, flossing, and dental visits. Injury prevention: discussed safety/seat belts, safety helmets, smoke detectors, carbon dioxide detectors, and smoking near bedding or upholstery. Sexual health: discussed sexually transmitted diseases, partner selection, use of condoms, avoidance of unintended pregnancy, and contraceptive alternatives. Exercise: the importance of regular exercise/physical activity was discussed. Recommend exercise 3-5 times per week for at least 30 minutes. BMI Counseling: Body mass index is 36.84 kg/m². The BMI is above normal. Nutrition recommendations include decreasing portion sizes and encouraging healthy choices of fruits and vegetables. Exercise recommendations include moderate physical activity 150 minutes/week. No pharmacotherapy was ordered. Rationale for BMI follow-up plan is due to patient being overweight or obese. Depression Screening and Follow-up Plan: Patient was screened for depression during today's encounter. They screened negative with a PHQ-2 score of 0. No follow-ups on file. Chief Complaint:     Chief Complaint   Patient presents with    Physical Exam     Patient being seen for Physical Exam      History of Present Illness:     Adult Annual Physical   Patient here for a comprehensive physical exam. The patient reports c/o acid reflux symptoms for the last few weeks      Diet and Physical Activity  Diet/Nutrition: well balanced diet and consuming 3-5 servings of fruits/vegetables daily. Exercise: no formal exercise. Depression Screening  PHQ-2/9 Depression Screening    Little interest or pleasure in doing things: 0 - not at all  Feeling down, depressed, or hopeless: 0 - not at all  PHQ-2 Score: 0  PHQ-2 Interpretation: Negative depression screen       General Health  Sleep: sleeps well. Hearing: normal - bilateral.  Vision: no vision problems and goes for regular eye exams. Dental: regular dental visits and brushes teeth twice daily.  Health  History of STDs?: no.    Advanced Care Planning  Do you have an advanced directive? no  Do you have a durable medical power of ? no     Review of Systems:     Review of Systems   Constitutional: Negative. HENT: Negative. Eyes: Negative. Respiratory: Negative. Cardiovascular: Negative. Gastrointestinal: Negative. Endocrine: Negative. Genitourinary: Negative. Musculoskeletal: Negative. Skin: Negative. Allergic/Immunologic: Negative. Neurological: Negative. Hematological: Negative. Psychiatric/Behavioral: Negative.         Past Medical History:     Past Medical History:   Diagnosis Date    Hypertension       Past Surgical History:     History reviewed. No pertinent surgical history. Social History:     Social History     Socioeconomic History    Marital status: Single     Spouse name: None    Number of children: None    Years of education: None    Highest education level: None   Occupational History    None   Tobacco Use    Smoking status: Never    Smokeless tobacco: Never   Vaping Use    Vaping Use: Never used   Substance and Sexual Activity    Alcohol use: Yes     Alcohol/week: 1.0 standard drink of alcohol     Types: 1 Glasses of wine per week    Drug use: Never    Sexual activity: Yes     Partners: Female     Birth control/protection: Condom Male   Other Topics Concern    None   Social History Narrative    None     Social Determinants of Health     Financial Resource Strain: Not on file   Food Insecurity: Not on file   Transportation Needs: Not on file   Physical Activity: Not on file   Stress: Not on file   Social Connections: Not on file   Intimate Partner Violence: Not on file   Housing Stability: Not on file      Family History:     Family History   Problem Relation Age of Onset    Hypertension Mother     Hypertension Father       Current Medications:     Current Outpatient Medications   Medication Sig Dispense Refill    olmesartan (BENICAR) 20 mg tablet Take 1 tablet (20 mg total) by mouth daily 90 tablet 3     No current facility-administered medications for this visit. Allergies:     No Known Allergies   Physical Exam:     /86 (BP Location: Left arm, Patient Position: Sitting, Cuff Size: Standard)   Pulse 72   Temp (!) 97.3 °F (36.3 °C) (Tympanic)   Resp 17   Ht 5' 8.94" (1.751 m)   Wt 113 kg (249 lb)   SpO2 95%   BMI 36.84 kg/m²     Physical Exam  Vitals and nursing note reviewed. Constitutional:       Appearance: Normal appearance. He is well-developed. HENT:      Head: Normocephalic and atraumatic.       Right Ear: Tympanic membrane normal.      Left Ear: Tympanic membrane normal. Nose: Nose normal.      Mouth/Throat:      Mouth: Mucous membranes are moist.   Eyes:      Pupils: Pupils are equal, round, and reactive to light. Cardiovascular:      Rate and Rhythm: Normal rate and regular rhythm. Pulses: Normal pulses. Heart sounds: Normal heart sounds. Pulmonary:      Effort: Pulmonary effort is normal.      Breath sounds: Normal breath sounds. Abdominal:      Palpations: Abdomen is soft. Musculoskeletal:         General: Normal range of motion. Cervical back: Normal range of motion and neck supple. Skin:     General: Skin is warm. Capillary Refill: Capillary refill takes less than 2 seconds. Neurological:      General: No focal deficit present. Mental Status: He is alert and oriented to person, place, and time.    Psychiatric:         Behavior: Behavior normal.          Render MD Marlys   [facility]

## 2023-11-10 ENCOUNTER — CLINICAL SUPPORT (OUTPATIENT)
Dept: NUTRITION | Facility: HOSPITAL | Age: 38
End: 2023-11-10
Payer: COMMERCIAL

## 2023-11-10 VITALS — WEIGHT: 251.4 LBS | BODY MASS INDEX: 37.19 KG/M2

## 2023-11-10 DIAGNOSIS — E66.01 MORBID (SEVERE) OBESITY DUE TO EXCESS CALORIES (HCC): ICD-10-CM

## 2023-11-10 PROCEDURE — 97802 MEDICAL NUTRITION INDIV IN: CPT | Performed by: DIETITIAN, REGISTERED

## 2023-11-10 NOTE — PROGRESS NOTES
Nutrition Assessment Form    Patient Name: Cee Matt    YOB: 1985    Sex: Male     Assessment Date: 11/10/2023  Start Time: 8719 Stop Time: 7348 Total Minutes: 61     Data:  Present at session: self   Parent/Patient Concerns/reason for visit: "I guess I need to lose weight"   Medical Dx/Reason for Referral: obesity   Past Medical History:   Diagnosis Date    Hypertension        Current Outpatient Medications   Medication Sig Dispense Refill    olmesartan (BENICAR) 20 mg tablet Take 1 tablet (20 mg total) by mouth daily 90 tablet 3     No current facility-administered medications for this visit. Additional Meds/Supplements: N/a   Special Learning Needs/barriers to learning/any new barriers N/a   Height: HC Readings from Last 5 Encounters:   No data found for [facility].      Weight: Wt Readings from Last 10 Encounters:   11/10/23 114 kg (251 lb 6.4 oz)   11/01/23 113 kg (249 lb)   04/27/23 115 kg (253 lb)   10/31/22 115 kg (254 lb 3.2 oz)   06/06/22 114 kg (251 lb 9.6 oz)   01/28/22 116 kg (256 lb 6.4 oz)   10/28/21 115 kg (253 lb)     Estimated body mass index is 37.19 kg/m² as calculated from the following:    Height as of 11/1/23: 5' 8.94" (1.751 m). Weight as of this encounter: 114 kg (251 lb 6.4 oz). Recent Weight Change: []Yes     [x]No  Amount:       Energy Needs: 1850-2350cals 25 cals/kg - 500-1000 for 1-2#/ wk wt loss   No Known Allergies or intolerances NKFA   Social History     Substance and Sexual Activity   Alcohol Use Yes    Alcohol/week: 1.0 standard drink of alcohol    Types: 1 Glasses of wine per week     1 drink a week   Social History     Tobacco Use   Smoking Status Never   Smokeless Tobacco Never       Who shops? 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 []Yes     [x]No  When:      Why:         Diet Hx: he has office day and works from home- currently using myfitbit 2429cals -  36 oz water/ diet soda/zero sugar gatorade  Breakfast: Diet:  12(when he gets to work)  8 am on w/e when up earlier- overnight oats- approx 420cals  brekki   Lunch: 5pm  Salad with chicken- grilled in package- dressing in the kit or blue cheese -  approx [address]: 9-930-10-- chili 1.5c 3/4 c feta pasta bake 2 oz nuts          Snacks: serving of white cheddar popcorn - s/w sized bag - aldis version of smart pop  Cheddar cheese stick 1-2 daily  Or nutrigrain type bar  [place] yogurt  Baby carrots and peanut butter AM -   PM -   HS -    Other Notes/ Initial Assessment:  Anival's main goals are to lose weight and get healthy. He wants to know how many calories he should eat in a day. He lives with his girlfriend and starts work at 15. He also struggles with normal portions and JOJO might be an issue as he feels his sleep quality is not great. Discussed appropriate meals, snacks, fluids, lifestyle guidelines for health including adequate sleep (getting tested for JOJO), stress management and importance including regular physical activity into his day. Provided sample menus- 3670-6516 cals estimated intake to lose weight, wt loss tips from NCM and RD name and number for home use. Follow up not desired at this point, will contact central schedule if desired.         Updated assessment (Follow up note only):       Nutrition Diagnosis:   Overweight/obesity  related to Excess energy intake as evidenced by  BMI more than normative standard for age and sex (obesity-grade II 35-39. 9)       Any change or new dx since previous visit:     Nutrition Diagnosis:   N/a      Medical Nutrition Therapy Intervention:  [x]Individualized Meal Plan-Discussed the importance of eating 3 meals daily and not skipping, and how metabolism is affected. Also discussed adding in small snacks or 5-6 small meals daily if desired vs 3 larger ones, and appropriate options and portions. Appropriate timing of meals, including eating within 1 hr of waking and eating meals slowly 20mins/meals, minimizing mindless/boredom or habitual eating, etc was also mentioned. Discussed the plate method of portioning foods, including half a plate fruits and vegetables or a half plate all vegetables, 1/4 of the plate a lean protein source or meat, and a 1/4 of the plate being a whole grain carb- usually 1/2-1c. This should be followed for at least 2 meals of the day, but could also be followed for all 3. Fluid intake discussed and appropriate amounts and choices, 16 oz with meals and additional 32oz during the day. S/S dehydration also covered. []Understanding Lab Values   []Basic Pathophysiology of Disease []Food/Medication Interactions   []Food Diary [x]Exercise-150 mins of moderate activity weekly, discussed importance of variety of activity, including wt bearing activities 2-3x/wk x 10-15mins. Discussed importance of activity throughout the lifecycle and its impact on overall health/stress management, etc.   [x]Lifestyle/Behavior Modification Techniques-Discussed the importance of adequate sleep, and ideal sleeping conditions, including a cool temperature 64-68 degrees F, and a dark and quiet room. Also discussed the importance of a regular and consistent sleep routine, including minimizing blue light exposure an hour before sleeping. Weekend habits should include staying fairly consistent with weekday sleep habits to minimize disruption during the week.   A connection was made between getting adequate and good quality sleep and the ability to handle stress the next day, make healthy food choices, and be active. []Medication, Mechanism of Action   []Label Reading: CHO/ Na/ Fat/ other_________ []Self Blood Glucose Monitoring   [x]Weight/BMI Goals: gain/lose/maintain-Short term goal of 2-4# x 1 month or next visit []Other -           Comprehension: []Excellent  []Very Good  [x]Good  []Fair   []Poor    Receptivity: []Excellent  []Very Good  [x]Good  []Fair   []Poor    Expected Compliance: []Excellent  []Very Good  [x]Good  []Fair   []Poor        Goals (initial)/ Progress made on previous goals/new goals:   0166-5888 cals daily   2.  64 oz fluid daily minimum   3. Portions per plate method as dicussed with RD       No follow-ups on file.   Labs:  CMP  Lab Results   Component Value Date    K 5.1 10/31/2023     10/31/2023    CO2 22 10/31/2023    BUN 14 10/31/2023    CREATININE 1.10 10/31/2023    GLUF 119 (H) 01/26/2022    CALCIUM 9.7 01/26/2022    AST 35 10/31/2023    ALT 51 (H) 10/31/2023    ALKPHOS 103 01/26/2022    EGFR 89 10/31/2023       BMP  Lab Results   Component Value Date    CALCIUM 9.7 01/26/2022    K 5.1 10/31/2023    CO2 22 10/31/2023     10/31/2023    BUN 14 10/31/2023    CREATININE 1.10 10/31/2023       Lipids  No results found for: "CHOL"  Lab Results   Component Value Date    HDL 37 (L) 10/31/2023    HDL 33 (L) 04/25/2023    HDL 34 (L) 01/26/2022     Lab Results   Component Value Date    LDLCALC 102 (H) 10/31/2023    LDLCALC 90 04/25/2023    LDLCALC 104 (H) 01/26/2022     Lab Results   Component Value Date    TRIG 141 10/31/2023    TRIG 150 (H) 04/25/2023    TRIG 109 01/26/2022     No results found for: "CHOLHDL"    Hemoglobin A1C  No results found for: "HGBA1C"    Fasting Glucose  Lab Results   Component Value Date    GLUF 119 (H) 01/26/2022       Insulin     Thyroid  No results found for: "TSH", "J6NEQTG", "R5MJPWN", "THYROIDAB"    Hepatic Function Panel  Lab Results   Component Value Date    ALT 51 (H) 10/31/2023    AST 35 10/31/2023    ALKPHOS 103 01/26/2022       Celiac Disease Antibody Panel  No results found for: "ENDOMYSIAL IGA", "GLIADIN IGA", "GLIADIN IGG", "IGA", "TISSUE TRANSGLUT AB", "TTG IGA"   Iron  No results found for: "IRON", "TIBC", "FERRITIN"         Praful Lal, [address] [facility]

## 2024-01-20 LAB
LEFT EYE DIABETIC RETINOPATHY: NORMAL
RIGHT EYE DIABETIC RETINOPATHY: NORMAL
SEVERITY (EYE EXAM): NORMAL

## 2024-02-13 ENCOUNTER — TELEPHONE (OUTPATIENT)
Dept: ADMINISTRATIVE | Facility: OTHER | Age: 39
End: 2024-02-13

## 2024-02-13 NOTE — TELEPHONE ENCOUNTER
Upon review of the In Basket request and the patient's chart, initial outreach has been made via fax to facility. Please see Contacts section for details.     Thank you  Althea Cortez

## 2024-04-30 LAB
ALBUMIN SERPL-MCNC: 4.5 G/DL (ref 4.1–5.1)
ALBUMIN/GLOB SERPL: 1.8 {RATIO} (ref 1.2–2.2)
ALP SERPL-CCNC: 99 IU/L (ref 44–121)
ALT SERPL-CCNC: 44 IU/L (ref 0–44)
AST SERPL-CCNC: 34 IU/L (ref 0–40)
BASOPHILS # BLD AUTO: 0 X10E3/UL (ref 0–0.2)
BASOPHILS NFR BLD AUTO: 1 %
BILIRUB SERPL-MCNC: 0.9 MG/DL (ref 0–1.2)
BUN SERPL-MCNC: 16 MG/DL (ref 6–20)
BUN/CREAT SERPL: 15 (ref 9–20)
CALCIUM SERPL-MCNC: 9.4 MG/DL (ref 8.7–10.2)
CHLORIDE SERPL-SCNC: 102 MMOL/L (ref 96–106)
CHOLEST SERPL-MCNC: 151 MG/DL (ref 100–199)
CHOLEST/HDLC SERPL: 4.3 RATIO (ref 0–5)
CO2 SERPL-SCNC: 22 MMOL/L (ref 20–29)
CREAT SERPL-MCNC: 1.05 MG/DL (ref 0.76–1.27)
EGFR: 94 ML/MIN/1.73
EOSINOPHIL # BLD AUTO: 0.2 X10E3/UL (ref 0–0.4)
EOSINOPHIL NFR BLD AUTO: 3 %
ERYTHROCYTE [DISTWIDTH] IN BLOOD BY AUTOMATED COUNT: 13 % (ref 11.6–15.4)
GLOBULIN SER-MCNC: 2.5 G/DL (ref 1.5–4.5)
GLUCOSE SERPL-MCNC: 103 MG/DL (ref 70–99)
HCT VFR BLD AUTO: 44 % (ref 37.5–51)
HCV AB S/CO SERPL IA: NON REACTIVE
HDLC SERPL-MCNC: 35 MG/DL
HGB BLD-MCNC: 14.7 G/DL (ref 13–17.7)
IMM GRANULOCYTES # BLD: 0 X10E3/UL (ref 0–0.1)
IMM GRANULOCYTES NFR BLD: 0 %
LDLC SERPL CALC-MCNC: 95 MG/DL (ref 0–99)
LYMPHOCYTES # BLD AUTO: 2 X10E3/UL (ref 0.7–3.1)
LYMPHOCYTES NFR BLD AUTO: 36 %
MCH RBC QN AUTO: 29.7 PG (ref 26.6–33)
MCHC RBC AUTO-ENTMCNC: 33.4 G/DL (ref 31.5–35.7)
MCV RBC AUTO: 89 FL (ref 79–97)
MONOCYTES # BLD AUTO: 0.7 X10E3/UL (ref 0.1–0.9)
MONOCYTES NFR BLD AUTO: 12 %
NEUTROPHILS # BLD AUTO: 2.7 X10E3/UL (ref 1.4–7)
NEUTROPHILS NFR BLD AUTO: 48 %
PLATELET # BLD AUTO: 255 X10E3/UL (ref 150–450)
POTASSIUM SERPL-SCNC: 4.8 MMOL/L (ref 3.5–5.2)
PROT SERPL-MCNC: 7 G/DL (ref 6–8.5)
RBC # BLD AUTO: 4.95 X10E6/UL (ref 4.14–5.8)
SL AMB VLDL CHOLESTEROL CALC: 21 MG/DL (ref 5–40)
SODIUM SERPL-SCNC: 138 MMOL/L (ref 134–144)
TRIGL SERPL-MCNC: 117 MG/DL (ref 0–149)
WBC # BLD AUTO: 5.6 X10E3/UL (ref 3.4–10.8)

## 2024-05-01 ENCOUNTER — OFFICE VISIT (OUTPATIENT)
Dept: FAMILY MEDICINE CLINIC | Facility: CLINIC | Age: 39
End: 2024-05-01
Payer: COMMERCIAL

## 2024-05-01 VITALS
BODY MASS INDEX: 38.17 KG/M2 | HEART RATE: 91 BPM | SYSTOLIC BLOOD PRESSURE: 130 MMHG | WEIGHT: 257.7 LBS | RESPIRATION RATE: 16 BRPM | DIASTOLIC BLOOD PRESSURE: 84 MMHG | HEIGHT: 69 IN | TEMPERATURE: 98.4 F | OXYGEN SATURATION: 97 %

## 2024-05-01 DIAGNOSIS — E66.01 MORBID (SEVERE) OBESITY DUE TO EXCESS CALORIES (HCC): ICD-10-CM

## 2024-05-01 DIAGNOSIS — K21.9 GASTROESOPHAGEAL REFLUX DISEASE WITHOUT ESOPHAGITIS: ICD-10-CM

## 2024-05-01 DIAGNOSIS — G47.19 EXCESSIVE DAYTIME SLEEPINESS: ICD-10-CM

## 2024-05-01 DIAGNOSIS — I10 PRIMARY HYPERTENSION: Primary | ICD-10-CM

## 2024-05-01 PROBLEM — R74.8 ELEVATED LIVER ENZYMES: Status: RESOLVED | Noted: 2022-06-06 | Resolved: 2024-05-01

## 2024-05-01 PROCEDURE — 99214 OFFICE O/P EST MOD 30 MIN: CPT | Performed by: FAMILY MEDICINE

## 2024-05-01 PROCEDURE — 3075F SYST BP GE 130 - 139MM HG: CPT | Performed by: FAMILY MEDICINE

## 2024-05-01 PROCEDURE — 3725F SCREEN DEPRESSION PERFORMED: CPT | Performed by: FAMILY MEDICINE

## 2024-05-01 PROCEDURE — 3079F DIAST BP 80-89 MM HG: CPT | Performed by: FAMILY MEDICINE

## 2024-05-01 RX ORDER — OMEPRAZOLE 20 MG/1
20 CAPSULE, DELAYED RELEASE ORAL
Qty: 30 CAPSULE | Refills: 5 | Status: SHIPPED | OUTPATIENT
Start: 2024-05-01 | End: 2024-10-28

## 2024-05-01 NOTE — PROGRESS NOTES
Name: Anival Iyre      : 1985      MRN: [id number]  Encounter Provider: Corinna Novak MD  Encounter Date: 2024   Encounter department: [facility]    Assessment & Plan     1. Primary hypertension  Assessment & Plan:  Stable on current meds    Orders:  -     Comprehensive metabolic panel  -     Lipid panel  -     CBC and differential  -     CBC and differential    2. Excessive daytime sleepiness  Assessment & Plan:  + daytime sleeping and snoring for the last 6 months  To check for sleep apnea     Orders:  -     Ambulatory Referral to Sleep Medicine; Future    3. Gastroesophageal reflux disease without esophagitis  Assessment & Plan:  Still having issues few times a week   Trial of omeprazole 20 mg daily    Orders:  -     omeprazole (PriLOSEC) 20 mg delayed release capsule; Take 1 capsule (20 mg total) by mouth daily before breakfast    4. Morbid (severe) obesity due to excess calories (HCC)  Assessment & Plan:  Diet and exercise encouraged              Subjective   Here for follow up  C/o snoring . Daytime sleepiness and unrestful sleep     Hypertension  This is a chronic problem. The current episode started more than 1 year ago. The problem has been waxing and waning since onset. Pertinent negatives include no anxiety, blurred vision, chest pain, headaches, malaise/fatigue, neck pain, orthopnea, palpitations, peripheral edema, PND, shortness of breath or sweats. Risk factors for coronary artery disease include male gender and obesity. There is no history of angina, kidney disease, CAD/MI, CVA, heart failure, left ventricular hypertrophy, PVD or retinopathy.     Review of Systems   Constitutional: Negative.  Negative for malaise/fatigue.   HENT: Negative.     Eyes:  Negative for blurred vision.   Respiratory: Negative.  Negative for shortness of breath.    Cardiovascular: Negative.  Negative for chest pain, palpitations, orthopnea and PND.   Gastrointestinal: Negative.     Endocrine: Negative.    Genitourinary: Negative.    Musculoskeletal: Negative.  Negative for neck pain.   Neurological:  Negative for headaches.   Hematological: Negative.    Psychiatric/Behavioral: Negative.         Past Medical History:   Diagnosis Date   • Elevated liver enzymes 06/06/2022   • Hypertension      History reviewed. No pertinent surgical history.  Family History   Problem Relation Age of Onset   • Hypertension Mother    • Hypertension Father      Social History     Socioeconomic History   • Marital status: Single     Spouse name: None   • Number of children: None   • Years of education: None   • Highest education level: None   Occupational History   • None   Tobacco Use   • Smoking status: Never   • Smokeless tobacco: Never   Vaping Use   • Vaping status: Never Used   Substance and Sexual Activity   • Alcohol use: Yes     Alcohol/week: 1.0 standard drink of alcohol     Types: 1 Glasses of wine per week   • Drug use: Never   • Sexual activity: Yes     Partners: Female     Birth control/protection: Condom Male   Other Topics Concern   • None   Social History Narrative   • None     Social Determinants of Health     Financial Resource Strain: Not on file   Food Insecurity: Not on file   Transportation Needs: Not on file   Physical Activity: Not on file   Stress: Not on file   Social Connections: Not on file   Intimate Partner Violence: Not on file   Housing Stability: Not on file     Current Outpatient Medications on File Prior to Visit   Medication Sig   • olmesartan (BENICAR) 20 mg tablet Take 1 tablet (20 mg total) by mouth daily     No Known Allergies  Immunization History   Administered Date(s) Administered   • COVID-19 MODERNA VACC 0.25 ML IM BOOSTER 12/22/2021   • COVID-19 MODERNA VACC 0.5 ML IM 02/08/2021, 03/08/2021   • COVID-19 Moderna Vac BIVALENT 12 Yr+ IM 0.5 ML 02/17/2023, 10/09/2023   • INFLUENZA 10/09/2023   • Influenza, injectable, quadrivalent, preservative free 0.5 mL 10/28/2021,  "10/31/2022   • Tdap 06/06/2022, 06/06/2022       Objective     /84 (BP Location: Left arm, Patient Position: Sitting, Cuff Size: Standard)   Pulse 91   Temp 98.4 °F (36.9 °C) (Tympanic)   Resp 16   Ht 5' 8.94\" (1.751 m)   Wt 117 kg (257 lb 11.2 oz)   SpO2 97%   BMI 38.12 kg/m²     Physical Exam  Vitals and nursing note reviewed.   Constitutional:       Appearance: Normal appearance.   HENT:      Right Ear: Tympanic membrane normal.      Left Ear: Tympanic membrane normal.   Cardiovascular:      Rate and Rhythm: Normal rate and regular rhythm.      Pulses: Normal pulses.      Heart sounds: Normal heart sounds.   Pulmonary:      Effort: Pulmonary effort is normal.      Breath sounds: Normal breath sounds.   Neurological:      General: No focal deficit present.      Mental Status: He is alert and oriented to person, place, and time.   Psychiatric:         Mood and Affect: Mood normal.         Behavior: Behavior normal.         Thought Content: Thought content normal.       Corinna Novak MD    "

## 2024-05-03 DIAGNOSIS — G47.19 EXCESSIVE DAYTIME SLEEPINESS: Primary | ICD-10-CM

## 2024-05-26 DIAGNOSIS — K21.9 GASTROESOPHAGEAL REFLUX DISEASE WITHOUT ESOPHAGITIS: ICD-10-CM

## 2024-05-26 RX ORDER — OMEPRAZOLE 20 MG/1
20 CAPSULE, DELAYED RELEASE ORAL
Qty: 90 CAPSULE | Refills: 1 | Status: SHIPPED | OUTPATIENT
Start: 2024-05-26

## 2024-06-11 DIAGNOSIS — I10 PRIMARY HYPERTENSION: ICD-10-CM

## 2024-06-11 RX ORDER — OLMESARTAN MEDOXOMIL 20 MG/1
20 TABLET ORAL DAILY
Qty: 90 TABLET | Refills: 0 | Status: SHIPPED | OUTPATIENT
Start: 2024-06-11

## 2024-06-27 DIAGNOSIS — I10 PRIMARY HYPERTENSION: ICD-10-CM

## 2024-06-27 RX ORDER — OLMESARTAN MEDOXOMIL 20 MG/1
20 TABLET ORAL DAILY
Qty: 90 TABLET | Refills: 1 | Status: SHIPPED | OUTPATIENT
Start: 2024-06-27

## 2024-06-28 ENCOUNTER — HOSPITAL ENCOUNTER (OUTPATIENT)
Dept: SLEEP CENTER | Facility: CLINIC | Age: 39
Discharge: HOME | End: 2024-06-28
Payer: COMMERCIAL

## 2024-06-28 DIAGNOSIS — G47.19 EXCESSIVE DAYTIME SLEEPINESS: ICD-10-CM

## 2024-06-28 PROCEDURE — G0399 HOME SLEEP TEST/TYPE 3 PORTA: HCPCS

## 2024-06-29 NOTE — PROGRESS NOTES
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]    Post-Op Diagnosis: Same       Procedure(s):  NASOPHARYNGOSCOPY EXCISION NASOPHARYNGEAL MASS    Surgeon(s):  Alek Tatum MD    Assistant:  Resident: Zackary Boss DO    Anesthesia: General    Estimated Blood Loss (mL): Minimal    Complications: None    Specimens:   ID Type Source Tests Collected by Time Destination   A : NASOPHARYNGEAL TISSUE Specimen Mouth SURGICAL PATHOLOGY Alek Tatum MD 12/14/2022 1006        Implants:  * No implants in log *      Drains: * No LDAs found *    Findings: lymphoid tissue nasopharynx    Electronically signed by Alek Tatum MD on 12/14/2022 at 10:11 AM Home Sleep Study Documentation    HOME STUDY DEVICE: Noxturnal yes                                           Holli G3 yes      Pre-Sleep Home Study:    Set-up and instructions performed by: LN    Technician performed demonstration for Patient: yes    Return demonstration performed by Patient: yes    Written instructions provided to Patient: yes    Patient signed consent form: yes        Post-Sleep Home Study:    Additional comments by Patient: Patient noted that he checked to make sure sensors were working properly on the machine in the middle of the night.    Home Sleep Study Failed:no:    Failure reason: N/A    Reported or Detected: N/A    Scored by: DIANNE Avina         normal (ped)...

## 2024-07-05 PROBLEM — G47.33 OSA (OBSTRUCTIVE SLEEP APNEA): Status: ACTIVE | Noted: 2024-07-05

## 2024-07-08 DIAGNOSIS — G47.33 OSA (OBSTRUCTIVE SLEEP APNEA): Primary | ICD-10-CM

## 2024-07-09 DIAGNOSIS — G47.33 OSA (OBSTRUCTIVE SLEEP APNEA): Primary | ICD-10-CM

## 2024-08-01 ENCOUNTER — TELEPHONE (OUTPATIENT)
Age: 39
End: 2024-08-01

## 2024-08-01 NOTE — TELEPHONE ENCOUNTER
Patient called in post missed call thia morning; RN could not determine nature of call. Patient reports a denial from insurer, but RN reviewed PCP comments regarding sleep study and need for CPAP study to determine settings. Patient verbalized understanding and will follow up with Sleep Medicine or Pulmonary. Patient does have consult OV scheduled with pulmonary for JOJO on 10/2/24.

## 2024-09-13 DIAGNOSIS — I10 PRIMARY HYPERTENSION: ICD-10-CM

## 2024-09-13 RX ORDER — OLMESARTAN MEDOXOMIL 20 MG/1
20 TABLET ORAL DAILY
Qty: 90 TABLET | Refills: 0 | Status: SHIPPED | OUTPATIENT
Start: 2024-09-13

## 2024-10-02 ENCOUNTER — CONSULT (OUTPATIENT)
Age: 39
End: 2024-10-02
Payer: COMMERCIAL

## 2024-10-02 VITALS
SYSTOLIC BLOOD PRESSURE: 118 MMHG | OXYGEN SATURATION: 97 % | DIASTOLIC BLOOD PRESSURE: 82 MMHG | HEIGHT: 68 IN | WEIGHT: 270 LBS | HEART RATE: 82 BPM | BODY MASS INDEX: 40.92 KG/M2

## 2024-10-02 DIAGNOSIS — G47.33 OSA (OBSTRUCTIVE SLEEP APNEA): Primary | ICD-10-CM

## 2024-10-02 PROCEDURE — 99203 OFFICE O/P NEW LOW 30 MIN: CPT | Performed by: INTERNAL MEDICINE

## 2024-10-02 RX ORDER — CETIRIZINE HYDROCHLORIDE 5 MG/1
5 TABLET, CHEWABLE ORAL DAILY
COMMUNITY

## 2024-10-02 NOTE — PROGRESS NOTES
Sleep Medicine Outpatient Note   Anival Iyer 39 y.o. male MRN: [id number]  10/2/2024      Referring Physician: Corinna Novak MD    Reason for Consultation:    Chief Complaint   Patient presents with    Sleep Apnea     Assessment/Plan:    1. JOJO (obstructive sleep apnea)  Assessment & Plan:  Moderate obstructive sleep apnea PATRIZIA 18 associated with significant daytime impairment including excessive daytime sleepiness, decreased concentration    I recommend a trial on auto titration CPAP 4-15 cm H2O.  The patient is agreeable for trial on CPAP  Prescription for CPAP will be sent to the durable medical equipment company.  The patient should be contacted by [employer] within 2 weeks.  If he is not contacted he should contact my office.    Follow-up 30 to 90 days after CPAP initiation  Orders:  -     CPAP Auto [facility] [place]      Health Maintenance  Immunization History   Administered Date(s) Administered    COVID-19 MODERNA VACC 0.25 ML IM BOOSTER 12/22/2021    COVID-19 MODERNA VACC 0.5 ML IM 02/08/2021, 03/08/2021    COVID-19 Moderna Vac BIVALENT 12 Yr+ IM 0.5 ML 02/17/2023, 10/09/2023    INFLUENZA 10/09/2023    Influenza, injectable, quadrivalent, preservative free 0.5 mL 10/28/2021, 10/31/2022    Tdap 06/06/2022, 06/06/2022        Return in about 3 months (around 1/2/2025).    History of Present Illness   HPI:  Anival Iyer is a 39 y.o. male who has a past medical history of GERD, hypertension, who is presenting for the evaluation of sleep apnea    Patient has had snoring, excessive daytime sleepiness for at least 6 months if not longer.  His PCP ordered a home sleep study that showed PATRIZIA 18.  He has such excessive daytime sleepiness that he has difficulty staying awake during a movie, and occasionally dozes off at work.  He has no problems driving.  He does wake up frequently with headaches.  He reports a gradual weight gain over the past years.    He denies gasping or choking or witnessed apneas but does mention that  "his girlfriend sometimes wakes him up asking him if he is okay    No OMFS or ENT surgery in the past.  No significant nasal congestion.  No regular use of nasal sprays.    No parasomnias, sleepwalking, dream enactment, restless legs    Usually goes to bed around midnight and falls asleep around 1230 1245.  He wakes up at 7:30 AM.  Sleep is unrefreshing.  Work hours are noon to 8 PM.  He generally goes to bed after coming back from from work and eating dinner.      Historical Information   Past Medical History:   Diagnosis Date    Elevated liver enzymes 06/06/2022    Hypertension      History reviewed. No pertinent surgical history.  Family History   Problem Relation Age of Onset    Hypertension Mother     Hypertension Father          Meds/Allergies     Current Outpatient Medications:     cetirizine (ZyrTEC) 5 MG chewable tablet, Chew 5 mg daily, Disp: , Rfl:     olmesartan (BENICAR) 20 mg tablet, TAKE 1 TABLET DAILY, Disp: 90 tablet, Rfl: 0    omeprazole (PriLOSEC) 20 mg delayed release capsule, TAKE 1 CAPSULE BY MOUTH DAILY BEFORE BREAKFAST., Disp: 90 capsule, Rfl: 1  No Known Allergies    Vitals: Blood pressure 118/82, pulse 82, height 5' 8\" (1.727 m), weight 122 kg (270 lb), SpO2 97%. Body mass index is 41.05 kg/m². Oxygen Therapy  SpO2: 97 %      Physical Exam  Vitals and nursing note reviewed.   Constitutional:       General: He is not in acute distress.     Appearance: Normal appearance. He is well-developed. He is not ill-appearing, toxic-appearing or diaphoretic.   HENT:      Head: Normocephalic and atraumatic.      Mouth/Throat:      Mouth: Mucous membranes are moist.      Pharynx: Oropharynx is clear. No oropharyngeal exudate.      Comments: Mallampati 4  Could not visualize uvula or tonsils  Eyes:      Conjunctiva/sclera: Conjunctivae normal.   Cardiovascular:      Rate and Rhythm: Normal rate and regular rhythm.   Pulmonary:      Effort: Pulmonary effort is normal. No respiratory distress.      Breath " "sounds: Normal breath sounds. No stridor.   Abdominal:      Tenderness: There is no guarding.   Musculoskeletal:         General: No swelling.      Cervical back: Normal range of motion and neck supple. No rigidity.      Right lower leg: No edema.      Left lower leg: No edema.   Skin:     General: Skin is warm and dry.   Neurological:      General: No focal deficit present.      Mental Status: He is alert and oriented to person, place, and time. Mental status is at baseline.   Psychiatric:         Mood and Affect: Mood normal.             Labs:   I have personally reviewed pertinent lab results.    ABG: No results found for: \"PHART\", \"OOJ6CNH\", \"PO2ART\", \"MMQ0VXD\", \"I8TMVAXT\", \"BEART\", \"SOURCE\",   BNP: No results found for: \"BNP\",   CBC:  Lab Results   Component Value Date    WBC 5.6 04/29/2024    HGB 14.7 04/29/2024    HCT 44.0 04/29/2024    MCV 89 04/29/2024     04/29/2024    EOSPCT 3 04/29/2024    EOSABS 0.2 04/29/2024    NEUTOPHILPCT 48 04/29/2024    LYMPHOPCT 36 04/29/2024   ,   CMP:   Lab Results   Component Value Date    SODIUM 138 04/29/2024    K 4.8 04/29/2024     04/29/2024    CO2 22 04/29/2024    BUN 16 04/29/2024    CREATININE 1.05 04/29/2024    CALCIUM 9.7 01/26/2022    AST 34 04/29/2024    ALT 44 04/29/2024    ALKPHOS 103 01/26/2022    EGFR 94 04/29/2024   ,   PT/INR: No results found for: \"PT\", \"INR\",   Ferrtin: No components found for: \"FERRTIN\",  Magensium: No results found for: \"MAGNESIUM\",    Imaging and other studies: I have personally reviewed pertinent reports.   and I have personally reviewed pertinent films in PACS    Sleep Study:  7/5/2024  HST - PATRIZIA 18.1      Dominic Julien MD  Pulmonary, Critical Care and Sleep Medicine  [facility]     Portions of the record may have been created with voice recognition software. Occasional wrong word or \"sound a like\" substitutions may have occurred due to the inherent limitations of voice recognition " software. Please read the chart carefully and recognize, using context, where substitutions have occurred.

## 2024-10-02 NOTE — ASSESSMENT & PLAN NOTE
Moderate obstructive sleep apnea PATRIZIA 18 associated with significant daytime impairment including excessive daytime sleepiness, decreased concentration    I recommend a trial on auto titration CPAP 4-15 cm H2O.  The patient is agreeable for trial on CPAP  Prescription for CPAP will be sent to the durable medical equipment company.  The patient should be contacted by [employer] within 2 weeks.  If he is not contacted he should contact my office.    Follow-up 30 to 90 days after CPAP initiation

## 2024-10-04 LAB
DME PARACHUTE DELIVERY DATE EXPECTED: NORMAL
DME PARACHUTE DELIVERY DATE REQUESTED: NORMAL
DME PARACHUTE ITEM DESCRIPTION: NORMAL
DME PARACHUTE ORDER STATUS: NORMAL
DME PARACHUTE SUPPLIER NAME: NORMAL
DME PARACHUTE SUPPLIER PHONE: NORMAL

## 2024-10-10 LAB

## 2024-10-24 ENCOUNTER — TELEPHONE (OUTPATIENT)
Age: 39
End: 2024-10-24

## 2024-11-05 LAB
ALBUMIN SERPL-MCNC: 4.3 G/DL (ref 4.1–5.1)
ALP SERPL-CCNC: 134 IU/L (ref 44–121)
ALT SERPL-CCNC: 63 IU/L (ref 0–44)
AST SERPL-CCNC: 47 IU/L (ref 0–40)
BASOPHILS # BLD AUTO: 0.1 X10E3/UL (ref 0–0.2)
BASOPHILS NFR BLD AUTO: 1 %
BILIRUB SERPL-MCNC: 0.6 MG/DL (ref 0–1.2)
BUN SERPL-MCNC: 12 MG/DL (ref 6–20)
BUN/CREAT SERPL: 11 (ref 9–20)
CALCIUM SERPL-MCNC: 9.1 MG/DL (ref 8.7–10.2)
CHLORIDE SERPL-SCNC: 102 MMOL/L (ref 96–106)
CHOLEST SERPL-MCNC: 147 MG/DL (ref 100–199)
CHOLEST/HDLC SERPL: 4 RATIO (ref 0–5)
CO2 SERPL-SCNC: 23 MMOL/L (ref 20–29)
CREAT SERPL-MCNC: 1.05 MG/DL (ref 0.76–1.27)
EGFR: 94 ML/MIN/1.73
EOSINOPHIL # BLD AUTO: 0.2 X10E3/UL (ref 0–0.4)
EOSINOPHIL NFR BLD AUTO: 3 %
ERYTHROCYTE [DISTWIDTH] IN BLOOD BY AUTOMATED COUNT: 13.1 % (ref 11.6–15.4)
GLOBULIN SER-MCNC: 2.6 G/DL (ref 1.5–4.5)
GLUCOSE SERPL-MCNC: 99 MG/DL (ref 70–99)
HCT VFR BLD AUTO: 42.9 % (ref 37.5–51)
HDLC SERPL-MCNC: 37 MG/DL
HGB BLD-MCNC: 14.2 G/DL (ref 13–17.7)
IMM GRANULOCYTES # BLD: 0 X10E3/UL (ref 0–0.1)
IMM GRANULOCYTES NFR BLD: 0 %
LDLC SERPL CALC-MCNC: 92 MG/DL (ref 0–99)
LYMPHOCYTES # BLD AUTO: 2.1 X10E3/UL (ref 0.7–3.1)
LYMPHOCYTES NFR BLD AUTO: 33 %
MCH RBC QN AUTO: 29.6 PG (ref 26.6–33)
MCHC RBC AUTO-ENTMCNC: 33.1 G/DL (ref 31.5–35.7)
MCV RBC AUTO: 89 FL (ref 79–97)
MONOCYTES # BLD AUTO: 0.8 X10E3/UL (ref 0.1–0.9)
MONOCYTES NFR BLD AUTO: 13 %
NEUTROPHILS # BLD AUTO: 3.1 X10E3/UL (ref 1.4–7)
NEUTROPHILS NFR BLD AUTO: 50 %
PLATELET # BLD AUTO: 247 X10E3/UL (ref 150–450)
POTASSIUM SERPL-SCNC: 4.4 MMOL/L (ref 3.5–5.2)
PROT SERPL-MCNC: 6.9 G/DL (ref 6–8.5)
RBC # BLD AUTO: 4.8 X10E6/UL (ref 4.14–5.8)
SL AMB VLDL CHOLESTEROL CALC: 18 MG/DL (ref 5–40)
SODIUM SERPL-SCNC: 139 MMOL/L (ref 134–144)
TRIGL SERPL-MCNC: 95 MG/DL (ref 0–149)
WBC # BLD AUTO: 6.3 X10E3/UL (ref 3.4–10.8)

## 2024-11-07 ENCOUNTER — OFFICE VISIT (OUTPATIENT)
Dept: FAMILY MEDICINE CLINIC | Facility: CLINIC | Age: 39
End: 2024-11-07
Payer: COMMERCIAL

## 2024-11-07 VITALS
HEART RATE: 91 BPM | SYSTOLIC BLOOD PRESSURE: 122 MMHG | HEIGHT: 68 IN | RESPIRATION RATE: 17 BRPM | OXYGEN SATURATION: 96 % | WEIGHT: 272 LBS | TEMPERATURE: 98.9 F | DIASTOLIC BLOOD PRESSURE: 80 MMHG | BODY MASS INDEX: 41.22 KG/M2

## 2024-11-07 DIAGNOSIS — G47.33 OSA (OBSTRUCTIVE SLEEP APNEA): ICD-10-CM

## 2024-11-07 DIAGNOSIS — Z23 NEED FOR COVID-19 VACCINE: ICD-10-CM

## 2024-11-07 DIAGNOSIS — E66.01 MORBID (SEVERE) OBESITY DUE TO EXCESS CALORIES (HCC): Primary | ICD-10-CM

## 2024-11-07 DIAGNOSIS — I10 PRIMARY HYPERTENSION: ICD-10-CM

## 2024-11-07 DIAGNOSIS — K21.9 GASTROESOPHAGEAL REFLUX DISEASE WITHOUT ESOPHAGITIS: ICD-10-CM

## 2024-11-07 DIAGNOSIS — Z23 ENCOUNTER FOR IMMUNIZATION: ICD-10-CM

## 2024-11-07 DIAGNOSIS — R74.8 ELEVATED LIVER ENZYMES: ICD-10-CM

## 2024-11-07 PROCEDURE — 90472 IMMUNIZATION ADMIN EACH ADD: CPT

## 2024-11-07 PROCEDURE — 90471 IMMUNIZATION ADMIN: CPT

## 2024-11-07 PROCEDURE — 99214 OFFICE O/P EST MOD 30 MIN: CPT | Performed by: FAMILY MEDICINE

## 2024-11-07 PROCEDURE — 90480 ADMN SARSCOV2 VAC 1/ONLY CMP: CPT

## 2024-11-07 PROCEDURE — 91320 SARSCV2 VAC 30MCG TRS-SUC IM: CPT

## 2024-11-07 PROCEDURE — 90656 IIV3 VACC NO PRSV 0.5 ML IM: CPT

## 2024-11-07 NOTE — ASSESSMENT & PLAN NOTE
Diet, exercise and portion control   Using myfitness pal dexter to track his calories   He is interested in taking weight loss meds  Prior Authorization Clinical Questions for Weight Management Pharmacotherapy    1. Does the patient have a contrainidcation to medication prescribed for weight management?: No  2. Does the patient have a diagnosis of obesity, confirmed by a BMI greater than or equal to 30 kg/m^2?: Yes  3. Does the patient have a BMI of greater than or equal to 27 kg/m^2 with at least one weight-related comorbidity/risk factor/complication (e.g. diabetes, dyslipidemia, coronary artery disease)?: Yes  5. Has the patient been on a weight loss regimen of low-calorie diet, increased physical activity, and lifestyle modifications for a minimum of 6 months?: Yes  6. Has the patient completed a comprehensive weight loss program (ie, Weight Watchers, Noom, Bariatrics, other dexter on phone)? If so, what?: No  7. Does the patient have a history of type 2 diabetes?: No  8. Has the member tried and failed other weight loss medication within the past 12 months?: No  9. Will the member use requested medication in combination with another GLP agonist or weight loss drug?: No  10. Is the medication a controlled substance?: No     Baseline weight (in pounds): 272 lbs

## 2024-11-07 NOTE — PROGRESS NOTES
Ambulatory Visit  Name: Anival Iyer      : 1985      MRN: [id number]  Encounter Provider: Corinna Novak MD  Encounter Date: 2024   Encounter department: [facility]    Assessment & Plan  Morbid (severe) obesity due to excess calories (HCC)  Diet, exercise and portion control   Using myfitness pal dexter to track his calories   He is interested in taking weight loss meds  Prior Authorization Clinical Questions for Weight Management Pharmacotherapy    1. Does the patient have a contrainidcation to medication prescribed for weight management?: No  2. Does the patient have a diagnosis of obesity, confirmed by a BMI greater than or equal to 30 kg/m^2?: Yes  3. Does the patient have a BMI of greater than or equal to 27 kg/m^2 with at least one weight-related comorbidity/risk factor/complication (e.g. diabetes, dyslipidemia, coronary artery disease)?: Yes  5. Has the patient been on a weight loss regimen of low-calorie diet, increased physical activity, and lifestyle modifications for a minimum of 6 months?: Yes  6. Has the patient completed a comprehensive weight loss program (ie, Weight Watchers, Noom, Bariatrics, other dexter on phone)? If so, what?: No  7. Does the patient have a history of type 2 diabetes?: No  8. Has the member tried and failed other weight loss medication within the past 12 months?: No  9. Will the member use requested medication in combination with another GLP agonist or weight loss drug?: No  10. Is the medication a controlled substance?: No     Baseline weight (in pounds): 272 lbs               Gastroesophageal reflux disease without esophagitis  Stable on omeprazole         Primary hypertension  Doing well on current meds         JOJO (obstructive sleep apnea)  Stable on CPAP machine at bedtime         Elevated liver enzymes    Orders:    Comprehensive metabolic panel    Encounter for immunization    Orders:    influenza vaccine preservative-free 0.5 mL IM (Fluzone,  Afluria, Fluarix, Flulaval)    Need for COVID-19 vaccine    Orders:    COVID-19 Pfizer mRNA vaccine 12 yr and older (Comirnaty pre-filled syringe)         History of Present Illness     HPI  Here for follow up  Feels well overall   No side effects from the meds   Hard time losing weight     Review of Systems   Constitutional: Negative.    HENT: Negative.     Eyes: Negative.    Respiratory: Negative.     Cardiovascular: Negative.    Gastrointestinal: Negative.    Endocrine: Negative.    Genitourinary: Negative.    Musculoskeletal: Negative.    Neurological: Negative.    Hematological: Negative.    Psychiatric/Behavioral: Negative.       Past Medical History:   Diagnosis Date    Elevated liver enzymes 06/06/2022    Hypertension     Sleep apnea      History reviewed. No pertinent surgical history.  Family History   Problem Relation Age of Onset    Hypertension Mother     Hypertension Father      Social History     Tobacco Use    Smoking status: Never    Smokeless tobacco: Never   Vaping Use    Vaping status: Never Used   Substance and Sexual Activity    Alcohol use: Yes     Alcohol/week: 1.0 standard drink of alcohol     Types: 1 Glasses of wine per week    Drug use: Never    Sexual activity: Yes     Partners: Female     Birth control/protection: Condom Male     Current Outpatient Medications on File Prior to Visit   Medication Sig    cetirizine (ZyrTEC) 5 MG chewable tablet Chew 5 mg daily    olmesartan (BENICAR) 20 mg tablet TAKE 1 TABLET DAILY    omeprazole (PriLOSEC) 20 mg delayed release capsule TAKE 1 CAPSULE BY MOUTH DAILY BEFORE BREAKFAST.     No Known Allergies  Immunization History   Administered Date(s) Administered    COVID-19 MODERNA VACC 0.25 ML IM BOOSTER 12/22/2021    COVID-19 MODERNA VACC 0.5 ML IM 02/08/2021, 03/08/2021    COVID-19 Moderna Vac BIVALENT 12 Yr+ IM 0.5 ML 02/17/2023, 10/09/2023    INFLUENZA 10/09/2023    Influenza, injectable, quadrivalent, preservative free 0.5 mL 10/28/2021, 10/31/2022  "   Tdap 06/06/2022, 06/06/2022     Objective     /80 (BP Location: Left arm, Patient Position: Sitting, Cuff Size: Large)   Pulse 91   Temp 98.9 °F (37.2 °C) (Tympanic)   Resp 17   Ht 5' 8.47\" (1.739 m)   Wt 123 kg (272 lb)   SpO2 96%   BMI 40.80 kg/m²     Physical Exam  Vitals and nursing note reviewed.   Constitutional:       Appearance: Normal appearance.   Eyes:      Extraocular Movements: Extraocular movements intact.      Pupils: Pupils are equal, round, and reactive to light.   Cardiovascular:      Rate and Rhythm: Normal rate and regular rhythm.      Pulses: Normal pulses.      Heart sounds: Normal heart sounds.   Pulmonary:      Effort: Pulmonary effort is normal.      Breath sounds: Normal breath sounds.   Neurological:      General: No focal deficit present.      Mental Status: He is alert.   Psychiatric:         Mood and Affect: Mood normal.         Behavior: Behavior normal.         "

## 2024-11-19 DIAGNOSIS — K21.9 GASTROESOPHAGEAL REFLUX DISEASE WITHOUT ESOPHAGITIS: ICD-10-CM

## 2024-11-28 LAB
ALBUMIN SERPL-MCNC: 4.2 G/DL (ref 4.1–5.1)
ALP SERPL-CCNC: 115 IU/L (ref 44–121)
ALT SERPL-CCNC: 67 IU/L (ref 0–44)
AST SERPL-CCNC: 43 IU/L (ref 0–40)
BILIRUB SERPL-MCNC: 0.7 MG/DL (ref 0–1.2)
BUN SERPL-MCNC: 17 MG/DL (ref 6–20)
BUN/CREAT SERPL: 18 (ref 9–20)
CALCIUM SERPL-MCNC: 9.4 MG/DL (ref 8.7–10.2)
CHLORIDE SERPL-SCNC: 103 MMOL/L (ref 96–106)
CO2 SERPL-SCNC: 24 MMOL/L (ref 20–29)
CREAT SERPL-MCNC: 0.96 MG/DL (ref 0.76–1.27)
EGFR: 104 ML/MIN/1.73
GLOBULIN SER-MCNC: 2.3 G/DL (ref 1.5–4.5)
GLUCOSE SERPL-MCNC: 110 MG/DL (ref 70–99)
POTASSIUM SERPL-SCNC: 4.7 MMOL/L (ref 3.5–5.2)
PROT SERPL-MCNC: 6.5 G/DL (ref 6–8.5)
SODIUM SERPL-SCNC: 139 MMOL/L (ref 134–144)

## 2024-11-29 ENCOUNTER — RESULTS FOLLOW-UP (OUTPATIENT)
Dept: FAMILY MEDICINE CLINIC | Facility: CLINIC | Age: 39
End: 2024-11-29

## 2024-11-29 DIAGNOSIS — K76.0 HEPATIC STEATOSIS: ICD-10-CM

## 2024-11-29 DIAGNOSIS — R74.8 ELEVATED LIVER ENZYMES: Primary | ICD-10-CM

## 2024-12-05 DIAGNOSIS — I10 PRIMARY HYPERTENSION: ICD-10-CM

## 2024-12-05 RX ORDER — OLMESARTAN MEDOXOMIL 20 MG/1
20 TABLET ORAL DAILY
Qty: 90 TABLET | Refills: 1 | Status: SHIPPED | OUTPATIENT
Start: 2024-12-05

## 2024-12-19 ENCOUNTER — OFFICE VISIT (OUTPATIENT)
Dept: GASTROENTEROLOGY | Facility: CLINIC | Age: 39
End: 2024-12-19
Payer: COMMERCIAL

## 2024-12-19 VITALS
SYSTOLIC BLOOD PRESSURE: 124 MMHG | BODY MASS INDEX: 41.37 KG/M2 | DIASTOLIC BLOOD PRESSURE: 82 MMHG | WEIGHT: 273 LBS | HEIGHT: 68 IN

## 2024-12-19 DIAGNOSIS — R74.8 ELEVATED LIVER ENZYMES: ICD-10-CM

## 2024-12-19 DIAGNOSIS — K76.0 HEPATIC STEATOSIS: ICD-10-CM

## 2024-12-19 PROCEDURE — 99203 OFFICE O/P NEW LOW 30 MIN: CPT | Performed by: STUDENT IN AN ORGANIZED HEALTH CARE EDUCATION/TRAINING PROGRAM

## 2024-12-19 NOTE — PATIENT INSTRUCTIONS
1.  Based on the data we have, I do suspect that your abnormal liver tests are from fat buildup of the liver called hepatic steatosis.  This can be easily treated with lifestyle interventions including weight loss, 5 to 10% total body weight over time.  We will check you for other less common causes of liver injury and repeat your ultrasound.  If all else is normal, we can recheck in 1 year.

## 2024-12-19 NOTE — PROGRESS NOTES
Name: Anival Iyer      : 1985      MRN: [id number]  Encounter Provider: Giorgi Mejia DO  Encounter Date: 2024   Encounter department: [facility]  :  Assessment & Plan  Elevated liver enzymes  Likely secondary to MASLD given obesity history and no other clear risk factors.  Hepatic steatosis seen on prior ultrasound.  Fib 4 score 0.79 with low probability of advanced fibrosis.  Hep C previously checked and negative.  Will check hepatitis B serologies, autoimmune serologies, iron panel to rule out other potential causes of chronic liver disease although low suspicion.  Recheck right upper quadrant ultrasound to ensure no parenchymal abnormalities.  Discussed lifestyle modifications including healthy diet and regular exercise with goal total body weight loss 5 to 10% over time which can reverse any fibrosis.  We will see back in 6 months for reassessment.  Orders:    Ambulatory Referral to Gastroenterology    Antimitochondrial antibody; Future    Anti-smooth muscle antibody, IgG; Future    Hepatitis B core antibody, total; Future    Hepatitis B surface antibody; Future    Hepatitis B surface antigen; Future    Iron, TIBC and Ferritin Panel; Future    Protime-INR; Future    US right upper quadrant with liver dopplers; Future    Hepatic steatosis  As above, likely source of his abnormal AST/ALT is MASLD.  Will monitor with lifestyle changes.  We did discuss that should his liver tests worsen over time with development of fibrosis, Rezdiffra could be considered. However, he can work on lifestyle modifications as discussed to begin.  Orders:    Ambulatory Referral to Gastroenterology    Antimitochondrial antibody; Future    Anti-smooth muscle antibody, IgG; Future    Hepatitis B core antibody, total; Future    Hepatitis B surface antibody; Future    Hepatitis B surface antigen; Future    Iron, TIBC and Ferritin Panel; Future    Protime-INR; Future     "US right upper quadrant with liver dopplers; Future        History of Present Illness   HPI  Anival Iyer is a 39 y.o. male  with past medical history of hypertension, obesity, BMI 41.51 and JOJO who is presenting for abnormal AST/ALT 43/67 on recent labs, November 27.    He actually has had normal AST/ALT dating back to 2021 and had an ultrasound in November 2021 for further evaluation.  This showed hepatic steatosis.  He has perhaps 1-2 beers on the weekends, otherwise no heavy alcohol use history.  No history of IV drug use/intranasal drug use.  No recent tobacco use history.  No family history of chronic liver disease.  He has no right upper quadrant pain, jaundice, ascites or any other sequelae of chronic liver disease.      History obtained from: patient    Review of Systems  Current Outpatient Medications on File Prior to Visit   Medication Sig Dispense Refill    cetirizine (ZyrTEC) 5 MG chewable tablet Chew 5 mg daily      olmesartan (BENICAR) 20 mg tablet TAKE 1 TABLET DAILY 90 tablet 1    omeprazole (PriLOSEC) 20 mg delayed release capsule TAKE 1 CAPSULE BY MOUTH EVERY DAY BEFORE BREAKFAST 90 capsule 1     No current facility-administered medications on file prior to visit.      Social History     Tobacco Use    Smoking status: Never    Smokeless tobacco: Never   Vaping Use    Vaping status: Never Used   Substance and Sexual Activity    Alcohol use: Yes     Alcohol/week: 1.0 standard drink of alcohol     Types: 1 Glasses of wine per week     Comment: 1 glass of wine or 1 can of beer    Drug use: Never    Sexual activity: Yes     Partners: Female     Birth control/protection: Condom Male        Objective   /82   Ht 5' 8\" (1.727 m)   Wt 124 kg (273 lb)   BMI 41.51 kg/m²      Physical Exam    General Appearance: NAD, cooperative, alert  Eyes: Anicteric  GI:  Soft, non-tender, non-distended; normal bowel sounds; no masses, no organomegaly; obese abdomen  Rectal: Deferred  Musculoskeletal: No " edema.  Skin:     No jaundice

## 2024-12-19 NOTE — ASSESSMENT & PLAN NOTE
Likely secondary to MASLD given obesity history and no other clear risk factors.  Hepatic steatosis seen on prior ultrasound.  Fib 4 score 0.79 with low probability of advanced fibrosis.  Hep C previously checked and negative.  Will check hepatitis B serologies, autoimmune serologies, iron panel to rule out other potential causes of chronic liver disease although low suspicion.  Recheck right upper quadrant ultrasound to ensure no parenchymal abnormalities.  Discussed lifestyle modifications including healthy diet and regular exercise with goal total body weight loss 5 to 10% over time which can reverse any fibrosis.  We will see back in 6 months for reassessment.  Orders:    Ambulatory Referral to Gastroenterology    Antimitochondrial antibody; Future    Anti-smooth muscle antibody, IgG; Future    Hepatitis B core antibody, total; Future    Hepatitis B surface antibody; Future    Hepatitis B surface antigen; Future    Iron, TIBC and Ferritin Panel; Future    Protime-INR; Future    US right upper quadrant with liver dopplers; Future

## 2025-01-25 NOTE — PROGRESS NOTES
Name: Anival Iyer      : 1985      MRN: [id number]  Encounter Provider: Dominic Julien MD  Encounter Date: 2025   Encounter department: [facility]  :  Assessment & Plan  JOJO (obstructive sleep apnea)  Moderate JOJO PATRIZIA associated with excessive daytime sleepiness  Started on auto CPAP with significant improvement in snoring and daytime sleepiness.  No longer falling asleep with movies and work    Tolerating CPAP reasonably well.  Occasional mask leaks as well as rare ear pressure    Compliance data  25  100% use over past 30 days  Average use 7 hours 46 minutes  Pressure m 8, 95% 10.8  95% 37.3  AHI 2.5    Pressure change to 4-12cm H2O  Resupply prescription provided, fullface mask, DME adapt  Follow up in 1 year  Orders:    PAP DME Resupply/Reorder    PAP DME Pressure Change        History of Present Illness   Anival Iyer is a 39 y.o. male who has a past medical history of GERD, hypertension, who is presenting for the evaluation of sleep apnea     25 - Follow up -started on CPAP and doing reasonably well.  Improvement in snoring and excessive daytime sleepiness.  No longer falling asleep watching movies or at work..  No major side effects from CPAP use.  No aerophagia.      10/2/24 - Consult - Patient has had snoring, excessive daytime sleepiness for at least 6 months if not longer.  His PCP ordered a home sleep study that showed PATRIZIA 18.  He has such excessive daytime sleepiness that he has difficulty staying awake during a movie, and occasionally dozes off at work.  He has no problems driving.  He does wake up frequently with headaches.  He reports a gradual weight gain over the past years.     He denies gasping or choking or witnessed apneas but does mention that his girlfriend sometimes wakes him up asking him if he is okay     No OMFS or ENT surgery in the past.  No significant nasal congestion.  No regular use of nasal sprays.     No parasomnias,  sleepwalking, dream enactment, restless legs     Usually goes to bed around midnight and falls asleep around 1230 [id number].  He wakes up at 7:30 AM.  Sleep is unrefreshing.  Work hours are noon to 8 PM.  He generally goes to bed after coming back from from work and eating dinner.    Review of Systems   Constitutional:  Negative for appetite change and fever.   HENT:  Negative for ear pain, postnasal drip, rhinorrhea, sneezing, sore throat and trouble swallowing.    Cardiovascular:  Negative for chest pain.   Musculoskeletal:  Negative for myalgias.   Neurological:  Negative for headaches.     Past Medical History   Past Medical History:   Diagnosis Date    Elevated liver enzymes 06/06/2022    Hypertension     Sleep apnea      History reviewed. No pertinent surgical history.  Family History   Problem Relation Age of Onset    Hypertension Mother     Hypertension Father     Diabetes Father       reports that he has never smoked. He has never used smokeless tobacco. He reports current alcohol use of about 1.0 standard drink of alcohol per week. He reports that he does not use drugs.  Current Outpatient Medications on File Prior to Visit   Medication Sig Dispense Refill    cetirizine (ZyrTEC) 5 MG chewable tablet Chew 5 mg daily      olmesartan (BENICAR) 20 mg tablet TAKE 1 TABLET DAILY 90 tablet 1    omeprazole (PriLOSEC) 20 mg delayed release capsule TAKE 1 CAPSULE BY MOUTH EVERY DAY BEFORE BREAKFAST 90 capsule 1     No current facility-administered medications on file prior to visit.   No Known Allergies      Historical Information       Objective   There were no vitals taken for this visit.     Physical Exam  Vitals and nursing note reviewed.   Constitutional:       General: He is not in acute distress.     Appearance: Normal appearance. He is well-developed. He is not ill-appearing, toxic-appearing or diaphoretic.   HENT:      Head: Normocephalic and atraumatic.   Eyes:      Conjunctiva/sclera: Conjunctivae normal.    Cardiovascular:      Rate and Rhythm: Normal rate.   Pulmonary:      Effort: Pulmonary effort is normal. No respiratory distress.   Abdominal:      Tenderness: There is no guarding.   Musculoskeletal:      Cervical back: Normal range of motion. No rigidity.   Neurological:      General: No focal deficit present.      Mental Status: He is alert and oriented to person, place, and time. Mental status is at baseline.   Psychiatric:         Mood and Affect: Mood normal.         Lab Results: I have reviewed pertinent labs.    Radiology Results Review : No pertinent imaging studies reviewed.  Other Study Results: Other Study Results Review : My interpretation of other studies include: 7/5/24 HSAT - PATRIZIA 18, moderate JOJO.  PFT Results Reviewed: NA

## 2025-01-28 ENCOUNTER — OFFICE VISIT (OUTPATIENT)
Age: 40
End: 2025-01-28
Payer: COMMERCIAL

## 2025-01-28 VITALS
WEIGHT: 266.6 LBS | HEART RATE: 62 BPM | OXYGEN SATURATION: 96 % | TEMPERATURE: 97.7 F | DIASTOLIC BLOOD PRESSURE: 72 MMHG | HEIGHT: 68 IN | BODY MASS INDEX: 40.41 KG/M2 | SYSTOLIC BLOOD PRESSURE: 124 MMHG

## 2025-01-28 DIAGNOSIS — G47.33 OSA (OBSTRUCTIVE SLEEP APNEA): Primary | ICD-10-CM

## 2025-01-28 PROCEDURE — 99213 OFFICE O/P EST LOW 20 MIN: CPT | Performed by: INTERNAL MEDICINE

## 2025-01-28 NOTE — PATIENT INSTRUCTIONS
[facility] Sleep Medicine Nursing Support:  When: Monday through Friday 7A-5PM except holidays  Where: Our direct line is [phone].    If you are having problems with equipment or need a mask fitting you may want to reach out to your durable medical equipment company first    IMPORTANT CONTACT PHONE NUMBERS:  Both the Sleep Medicine and Pulmonary Department manages CPAP/BIPAPs.  If depends on where you are typically seen on who to contact  [facility] Sleep Medicine Nursing Support: [phone] (if you are seen in a [facility] Sleep Medicine office)  [facility] Pulmonary: [phone] (if you are seen in a [facility] Pulmonary office)  [facility] - [phone] ([place]), [phone] ([place])  If you are unsure, please send me a message on Mobi and I can help    CPAP/BIPAP MAINTENANCE AND MANAGEMENT  1.  Continue use of CPAP/BIPAP equipment nightly - use any time you are laying down to rest, watch TV, etc to increase use and in case you fall asleep to prevent falling asleep without it  2.  If air is too hot, turn down the tubing heating setting  3.  If excessive dry mouth in the morning, turn up humidifier setting and be sure to only use distilled water in your humidifier.  You can also turn down the tubing heating setting  4.  Change your filter regularly and wash the non-disposable filter  5.  Continue to clean your equipment, as discussed, using mild soap and water.  You can use unscented baby wipe on the mask.  6.  Contact the Sleep Disorders Center with any questions or concerns prior to your next visit, as needed  7.  Schedule visit for follow-up in 1 year.  You should see your sleep physician at least once a year.      HOW TO CLEAN YOUR AUTO CPAP MACHINE  Mask: Clean the cushion of your mask daily. Dish soap and warm water.  (Usually eligible for mask cushions every 3 months)  2.  Headgear: Once a week. I find when you wash it daily it eats the material of the Velcro  faster.  (Usually eligible for new headgear every 6 months)  3.  Heated Tubing: Clean the tube every 3-7 days. One drop of dish soap run warm water through the tube then hang it over your shower curtain and let it drip dry. (Usually eligible every 3 months)  4.  Humidifier: Rinse out every 1-3 days. Dish soap warm water or , top shelf. (eligible every 6 months) **DISTILLED WATER ONLY**  5. Filters:  Dark blue (reusable for 6 months)- Rinse under warm water (no soap) sit and drip dry every 2-3 weeks.  (eligible for a new one every 6 months)  Light Blue (disposable) throw away every 2-4 weeks depending on how dirty it looks. (eligible for 6 every 3 months)    Medicare/Private Insurance Requirements with CPAP  Your insurance requires a face-to-face follow up visit within a 31-90 day period after starting CPAP.  Your insurance requires compliance with CPAP, which is at least 4 hours per night for 70% of the time. This must be done over a 30 day period and must occur within the initial 31-90 day period after starting CPAP.  Your insurance also requires at least yearly follow ups to continue to pay for CPAP supplies.  Check with your insurance and durable medical equipment company regarding supply replacements.     OTHER SLEEP MEDICINE ISSUES  If you are having a true emergency please call 911.  In the event that the line is busy or it is after hours please leave a voice message and we will return your call.  Please speak clearly, leaving your full name, birth date, best number to reach you and the reason for your call.   Medication refills: We will need the name of the medication, the dosage, the ordering provider, whether you get a 30 or 90 day refill, and the pharmacy name and address.  Medications will be ordered by the provider only.  Nurses cannot call in prescriptions.  Please allow 7 days for medication refills.  Physician requested updates: If your provider requested that you call with an update after  starting medication, please be ready to provide us the medication and dosage, what time you take your medication, the time you attempt to fall asleep, time you fall asleep, when you wake up, and what time you get out of bed.  Sleep Study Results: We will contact you with sleep study results and/or next steps after the physician has reviewed your testing.  Usually one of our nurses will call to talk about the results within 1-2 weeks of testing.

## 2025-01-28 NOTE — ASSESSMENT & PLAN NOTE
Moderate JOJO PATRIZIA associated with excessive daytime sleepiness  Started on auto CPAP with significant improvement in snoring and daytime sleepiness.  No longer falling asleep with movies and work    Tolerating CPAP reasonably well.  Occasional mask leaks as well as rare ear pressure    Compliance data  1/25/25  100% use over past 30 days  Average use 7 hours 46 minutes  Pressure m 8, 95% 10.8  95% 37.3  AHI 2.5    Pressure change to 4-12cm H2O  Resupply prescription provided, fullface mask, DME adapt  Follow up in 1 year  Orders:    PAP DME Resupply/Reorder    PAP DME Pressure Change

## 2025-01-31 LAB
DME PARACHUTE DELIVERY DATE REQUESTED: NORMAL
DME PARACHUTE ITEM DESCRIPTION: NORMAL
DME PARACHUTE ORDER STATUS: NORMAL
DME PARACHUTE SUPPLIER NAME: NORMAL
DME PARACHUTE SUPPLIER PHONE: NORMAL

## 2025-02-10 ENCOUNTER — RESULTS FOLLOW-UP (OUTPATIENT)
Dept: GASTROENTEROLOGY | Facility: CLINIC | Age: 40
End: 2025-02-10

## 2025-02-10 LAB
ACTIN IGG SERPL-ACNC: 4 UNITS (ref 0–19)
HBV CORE AB SERPL QL IA: NEGATIVE
HBV SURFACE AB SER-ACNC: 15.1 MIU/ML
HBV SURFACE AG SERPL QL IA: NEGATIVE
INR PPP: 1 (ref 0.9–1.2)
MITOCHONDRIA M2 IGG SER-ACNC: <20 UNITS (ref 0–20)
PROTHROMBIN TIME: 11.3 SEC (ref 9.1–12)

## 2025-02-11 ENCOUNTER — RESULTS FOLLOW-UP (OUTPATIENT)
Dept: GASTROENTEROLOGY | Facility: CLINIC | Age: 40
End: 2025-02-11

## 2025-02-11 LAB
FERRITIN SERPL-MCNC: 215 NG/ML (ref 30–400)
IRON SATN MFR SERPL: 22 % (ref 15–55)
IRON SERPL-MCNC: 60 UG/DL (ref 38–169)
TIBC SERPL-MCNC: 273 UG/DL (ref 250–450)
UIBC SERPL-MCNC: 213 UG/DL (ref 111–343)

## 2025-02-16 ENCOUNTER — HOSPITAL ENCOUNTER (OUTPATIENT)
Dept: ULTRASOUND IMAGING | Facility: HOSPITAL | Age: 40
Discharge: HOME | End: 2025-02-16
Attending: STUDENT IN AN ORGANIZED HEALTH CARE EDUCATION/TRAINING PROGRAM
Payer: COMMERCIAL

## 2025-02-16 DIAGNOSIS — R74.8 ELEVATED LIVER ENZYMES: ICD-10-CM

## 2025-02-16 DIAGNOSIS — K76.0 HEPATIC STEATOSIS: ICD-10-CM

## 2025-02-16 PROCEDURE — 76705 ECHO EXAM OF ABDOMEN: CPT

## 2025-02-21 ENCOUNTER — RESULTS FOLLOW-UP (OUTPATIENT)
Dept: GASTROENTEROLOGY | Facility: CLINIC | Age: 40
End: 2025-02-21

## 2025-03-06 ENCOUNTER — OFFICE VISIT (OUTPATIENT)
Dept: FAMILY MEDICINE CLINIC | Facility: CLINIC | Age: 40
End: 2025-03-06
Payer: COMMERCIAL

## 2025-03-06 VITALS
HEIGHT: 68 IN | DIASTOLIC BLOOD PRESSURE: 80 MMHG | HEART RATE: 88 BPM | RESPIRATION RATE: 17 BRPM | BODY MASS INDEX: 40.16 KG/M2 | WEIGHT: 265 LBS | TEMPERATURE: 97.8 F | OXYGEN SATURATION: 98 % | SYSTOLIC BLOOD PRESSURE: 120 MMHG

## 2025-03-06 DIAGNOSIS — I10 PRIMARY HYPERTENSION: ICD-10-CM

## 2025-03-06 DIAGNOSIS — K21.9 GASTROESOPHAGEAL REFLUX DISEASE WITHOUT ESOPHAGITIS: ICD-10-CM

## 2025-03-06 DIAGNOSIS — Z00.00 ANNUAL PHYSICAL EXAM: Primary | ICD-10-CM

## 2025-03-06 PROCEDURE — 99395 PREV VISIT EST AGE 18-39: CPT | Performed by: FAMILY MEDICINE

## 2025-03-06 RX ORDER — OLMESARTAN MEDOXOMIL 20 MG/1
20 TABLET ORAL DAILY
Qty: 90 TABLET | Refills: 1 | Status: SHIPPED | OUTPATIENT
Start: 2025-03-06

## 2025-03-06 RX ORDER — OMEPRAZOLE 20 MG/1
20 CAPSULE, DELAYED RELEASE ORAL
Qty: 90 CAPSULE | Refills: 1 | Status: SHIPPED | OUTPATIENT
Start: 2025-03-06

## 2025-03-06 NOTE — ASSESSMENT & PLAN NOTE
Stable on omeprazole   Orders:  •  omeprazole (PriLOSEC) 20 mg delayed release capsule; Take 1 capsule (20 mg total) by mouth daily before breakfast

## 2025-03-06 NOTE — ASSESSMENT & PLAN NOTE
Orders:  •  olmesartan (BENICAR) 20 mg tablet; Take 1 tablet (20 mg total) by mouth daily  •  Comprehensive metabolic panel  •  CBC and differential  •  Lipid panel  •  Hemoglobin A1C  •  TSH, 3rd generation with Free T4 reflex

## 2025-03-06 NOTE — PROGRESS NOTES
Adult Annual Physical  Name: Anival Iyer      : 1985      MRN: [id number]  Encounter Provider: Corinna Novak MD  Encounter Date: 3/6/2025   Encounter department: [facility]    Assessment & Plan  Annual physical exam    Orders:  •  Comprehensive metabolic panel  •  CBC and differential  •  Lipid panel  •  Hemoglobin A1C  •  TSH, 3rd generation with Free T4 reflex    Gastroesophageal reflux disease without esophagitis  Stable on omeprazole   Orders:  •  omeprazole (PriLOSEC) 20 mg delayed release capsule; Take 1 capsule (20 mg total) by mouth daily before breakfast    Primary hypertension    Orders:  •  olmesartan (BENICAR) 20 mg tablet; Take 1 tablet (20 mg total) by mouth daily  •  Comprehensive metabolic panel  •  CBC and differential  •  Lipid panel  •  Hemoglobin A1C  •  TSH, 3rd generation with Free T4 reflex    Immunizations and preventive care screenings were discussed with patient today. Appropriate education was printed on patient's after visit summary.    Counseling:  Alcohol/drug use: discussed moderation in alcohol intake, the recommendations for healthy alcohol use, and avoidance of illicit drug use.  Dental Health: discussed importance of regular tooth brushing, flossing, and dental visits.  Injury prevention: discussed safety/seat belts, safety helmets, smoke detectors, carbon monoxide detectors, and smoking near bedding or upholstery.  Sexual health: discussed sexually transmitted diseases, partner selection, use of condoms, avoidance of unintended pregnancy, and contraceptive alternatives.  Exercise: the importance of regular exercise/physical activity was discussed. Recommend exercise 3-5 times per week for at least 30 minutes.          History of Present Illness     Adult Annual Physical:  Patient presents for annual physical.     Diet and Physical Activity:  - Diet/Nutrition: consuming 3-5 servings of fruits/vegetables daily.  - Exercise: no formal  exercise.    General Health:  - Sleep: sleeps well and 7-8 hours of sleep on average.  - Hearing: normal hearing bilateral ears.  - Vision: goes for regular eye exams and no vision problems.  - Dental: regular dental visits and brushes teeth twice daily.    /GYN Health:    - History of STDs: no     Health:  - History of STDs: no.     Advanced Care Planning:  - Has an advanced directive?: no    - Has a durable medical POA?: no    - ACP document given to patient?: no      Review of Systems   Constitutional: Negative.  Negative for chills and fever.   HENT: Negative.  Negative for ear pain and sore throat.    Eyes: Negative.  Negative for pain and visual disturbance.   Respiratory: Negative.  Negative for cough and shortness of breath.    Cardiovascular:  Negative for chest pain and palpitations.   Gastrointestinal:  Negative for abdominal pain and vomiting.   Endocrine: Negative.    Genitourinary:  Negative for dysuria and hematuria.   Musculoskeletal:  Negative for arthralgias and back pain.   Skin:  Negative for color change and rash.   Allergic/Immunologic: Negative.    Neurological: Negative.  Negative for seizures and syncope.   Hematological: Negative.    Psychiatric/Behavioral: Negative.     All other systems reviewed and are negative.    Medical History Reviewed by provider this encounter:  Allergies  Meds  Problems     .  Past Medical History   Past Medical History:   Diagnosis Date   • Elevated liver enzymes 06/06/2022   • Hypertension    • Sleep apnea      History reviewed. No pertinent surgical history.  Family History   Problem Relation Age of Onset   • Hypertension Mother    • Hypertension Father    • Diabetes Father       reports that he has never smoked. He has never been exposed to tobacco smoke. He has never used smokeless tobacco. He reports current alcohol use of about 1.0 standard drink of alcohol per week. He reports that he does not use drugs.  Current Outpatient Medications   Medication  "Instructions   • cetirizine (ZYRTEC) 5 mg, Daily   • olmesartan (BENICAR) 20 mg, Oral, Daily   • omeprazole (PRILOSEC) 20 mg, Oral, Daily before breakfast   No Known Allergies   Current Outpatient Medications on File Prior to Visit   Medication Sig Dispense Refill   • cetirizine (ZyrTEC) 5 MG chewable tablet Chew 5 mg daily     • [DISCONTINUED] olmesartan (BENICAR) 20 mg tablet TAKE 1 TABLET DAILY 90 tablet 1   • [DISCONTINUED] omeprazole (PriLOSEC) 20 mg delayed release capsule TAKE 1 CAPSULE BY MOUTH EVERY DAY BEFORE BREAKFAST 90 capsule 1     No current facility-administered medications on file prior to visit.      Social History     Tobacco Use   • Smoking status: Never     Passive exposure: Never   • Smokeless tobacco: Never   Vaping Use   • Vaping status: Never Used   Substance and Sexual Activity   • Alcohol use: Yes     Alcohol/week: 1.0 standard drink of alcohol     Types: 1 Glasses of wine per week     Comment: 1 glass of wine or 1 can of beer   • Drug use: Never   • Sexual activity: Yes     Partners: Female     Birth control/protection: Condom Male       Objective   /80 (BP Location: Left arm, Patient Position: Sitting, Cuff Size: Standard)   Pulse 88   Temp 97.8 °F (36.6 °C) (Tympanic)   Resp 17   Ht 5' 8.27\" (1.734 m)   Wt 120 kg (265 lb)   SpO2 98%   BMI 39.98 kg/m²     Physical Exam  Vitals and nursing note reviewed.   Constitutional:       Appearance: Normal appearance. He is well-developed.   HENT:      Head: Normocephalic and atraumatic.      Right Ear: Tympanic membrane normal.      Left Ear: Tympanic membrane normal.      Nose: Nose normal.      Mouth/Throat:      Mouth: Mucous membranes are moist.   Eyes:      Pupils: Pupils are equal, round, and reactive to light.   Neck:      Thyroid: No thyromegaly.   Cardiovascular:      Rate and Rhythm: Normal rate and regular rhythm.      Pulses: Normal pulses.      Heart sounds: Normal heart sounds. No murmur heard.  Pulmonary:      Effort: " Pulmonary effort is normal.      Breath sounds: Normal breath sounds.   Abdominal:      General: Bowel sounds are normal.      Palpations: Abdomen is soft.   Musculoskeletal:         General: No deformity. Normal range of motion.      Cervical back: Normal range of motion and neck supple.   Skin:     General: Skin is warm.      Findings: No erythema or rash.   Neurological:      Mental Status: He is alert and oriented to person, place, and time.      Deep Tendon Reflexes: Reflexes are normal and symmetric.   Psychiatric:         Behavior: Behavior normal.

## 2025-06-01 DIAGNOSIS — I10 PRIMARY HYPERTENSION: ICD-10-CM

## 2025-06-01 RX ORDER — OLMESARTAN MEDOXOMIL 20 MG/1
20 TABLET ORAL DAILY
Qty: 90 TABLET | Refills: 0 | Status: SHIPPED | OUTPATIENT
Start: 2025-06-01

## 2025-06-19 ENCOUNTER — TELEPHONE (OUTPATIENT)
Dept: GASTROENTEROLOGY | Facility: CLINIC | Age: 40
End: 2025-06-19

## 2025-06-19 ENCOUNTER — OFFICE VISIT (OUTPATIENT)
Dept: GASTROENTEROLOGY | Facility: CLINIC | Age: 40
End: 2025-06-19
Payer: COMMERCIAL

## 2025-06-19 VITALS
BODY MASS INDEX: 40.77 KG/M2 | SYSTOLIC BLOOD PRESSURE: 118 MMHG | HEIGHT: 68 IN | DIASTOLIC BLOOD PRESSURE: 80 MMHG | WEIGHT: 269 LBS

## 2025-06-19 DIAGNOSIS — K21.9 GASTROESOPHAGEAL REFLUX DISEASE WITHOUT ESOPHAGITIS: ICD-10-CM

## 2025-06-19 DIAGNOSIS — K76.0 HEPATIC STEATOSIS: Primary | ICD-10-CM

## 2025-06-19 PROCEDURE — 99214 OFFICE O/P EST MOD 30 MIN: CPT | Performed by: STUDENT IN AN ORGANIZED HEALTH CARE EDUCATION/TRAINING PROGRAM

## 2025-06-19 RX ORDER — SODIUM CHLORIDE, SODIUM LACTATE, POTASSIUM CHLORIDE, CALCIUM CHLORIDE 600; 310; 30; 20 MG/100ML; MG/100ML; MG/100ML; MG/100ML
125 INJECTION, SOLUTION INTRAVENOUS CONTINUOUS
OUTPATIENT
Start: 2025-06-19

## 2025-06-19 NOTE — PATIENT INSTRUCTIONS
Due to your persistent reflux requiring long term medication, an endoscopy is warranted to rule out any erosive disease or any changes that would pose a pre-cancerous risk.    We will recheck your liver numbers and if stable, follow up 1 year.

## 2025-06-19 NOTE — TELEPHONE ENCOUNTER
Scheduled date of EGD(as of today): 8/6/25  Physician performing EGD: Dr. Mejia  Location of EGD: [facility]  Instructions reviewed with patient by: Lucía Avalos  Clearances: none

## 2025-06-19 NOTE — PROGRESS NOTES
Name: Anival Iyer      : 1985      MRN: [id number]  Encounter Provider: Giorgi Mejia DO  Encounter Date: 2025   Encounter department: [facility]  :  Assessment & Plan  Hepatic steatosis  Secondary to MASLD. Autoimmune and viral serologies negative. Iron panel normal.    RUQ  2025 with steatosis. FIB-4 0.79 --> no advanced fibrosis.    Recheck LFTs now and if stable, trend annually.    Discussed lifestyle modifications including total body weight loss 5-10% which can reverse fatty changes in the liver and even advanced fibrosis. Nutrition consult offered, but patient will hold off for now. If lifestyle modifications enough are not alone, consideration for referral to medical weight management in the future.    Orders:    Hepatic function panel; Future    Gastroesophageal reflux disease without esophagitis  Has been on omeprazole for several months. Due to ongoing need for medication, age,  ethnicity and BMI which are all risk factors for Martinez's, will proceed with EGD. Procedure, preparation, risks/benefits discussed in detail.  Orders:    EGD; Future    BMI 40.0-44.9, adult (HCC)  As above, risk factor for MASLD and Martinez's. Ongoing weight loss goals discussed. Proceed with EGD as above.         Assessment & Plan    History of Present Illness   History of Present Illness  Anival Iyer is a 39 y.o. male who presents for evaluation of MASLD and history of GERD. He was last seen in 2024 and had serologies including autoimmune, iron and viral hepatitis serologies which were negative.    FIB-4 score 0.79, indicating no advanced fibrosis.    RUQ  2025 with steatosis, otherwise no acute findings.    Has long term hx GERD and has been on omeprazole for more than 6 months. Hasn't been able to come off of it yet.    History obtained from: patient  Review of Systems A complete review of systems is negative other than  "that noted above in the HPI.    Medications Ordered Prior to Encounter[1]   Social History[2]  Current Medications[3]  Objective   /80   Ht 5' 8\" (1.727 m)   Wt 122 kg (269 lb)   BMI 40.90 kg/m²   Physical Exam  General Appearance: NAD, cooperative, alert  Eyes: Anicteric  GI:  Soft, non-tender, non-distended; normal bowel sounds; no masses, no organomegaly   Rectal: Deferred  Musculoskeletal: No edema.  Skin:     No jaundice      Results    Lab Results: I personally reviewed relevant lab results.                  [1]   Current Outpatient Medications on File Prior to Visit   Medication Sig Dispense Refill    cetirizine (ZyrTEC) 5 MG chewable tablet Chew 5 mg in the morning.      olmesartan (BENICAR) 20 mg tablet Take 1 tablet (20 mg total) by mouth daily 90 tablet 0    omeprazole (PriLOSEC) 20 mg delayed release capsule Take 1 capsule (20 mg total) by mouth daily before breakfast 90 capsule 1     No current facility-administered medications on file prior to visit.   [2]   Social History  Tobacco Use    Smoking status: Never     Passive exposure: Never    Smokeless tobacco: Never   Vaping Use    Vaping status: Never Used   Substance and Sexual Activity    Alcohol use: Yes     Alcohol/week: 1.0 standard drink of alcohol     Types: 1 Glasses of wine per week     Comment: 1 glass of wine or 1 can of beer    Drug use: Never    Sexual activity: Yes     Partners: Female     Birth control/protection: Condom Male   [3]   Current Outpatient Medications   Medication Sig Dispense Refill    cetirizine (ZyrTEC) 5 MG chewable tablet Chew 5 mg in the morning.      olmesartan (BENICAR) 20 mg tablet Take 1 tablet (20 mg total) by mouth daily 90 tablet 0    omeprazole (PriLOSEC) 20 mg delayed release capsule Take 1 capsule (20 mg total) by mouth daily before breakfast 90 capsule 1     No current facility-administered medications for this visit.     "

## 2025-06-19 NOTE — ASSESSMENT & PLAN NOTE
Has been on omeprazole for several months. Due to ongoing need for medication, age,  ethnicity and BMI which are all risk factors for Martinez's, will proceed with EGD. Procedure, preparation, risks/benefits discussed in detail.  Orders:    EGD; Future

## 2025-06-26 ENCOUNTER — TELEPHONE (OUTPATIENT)
Age: 40
End: 2025-06-26

## 2025-06-26 NOTE — TELEPHONE ENCOUNTER
Scheduled date of EGD(as of today): 7/30/2025  Physician performing EGD: DR ROSARIO  Location of EGD: [facility]  Instructions reviewed with patient by: Verified pt has all procedure directions.   Clearances: n/a    Patient requesting [facility] location.

## 2025-07-08 ENCOUNTER — DOCUMENTATION (OUTPATIENT)
Age: 40
End: 2025-07-08

## 2025-07-08 ENCOUNTER — TELEPHONE (OUTPATIENT)
Age: 40
End: 2025-07-08

## 2025-07-08 NOTE — TELEPHONE ENCOUNTER
Jaylene for pt a dr piper pt.  He is on my recall list for a 1 year f/u for [facility]   he called for appt, not sure if he is confused or if my recall list  is wrong.

## 2025-07-16 ENCOUNTER — ANESTHESIA (OUTPATIENT)
Dept: ANESTHESIOLOGY | Facility: AMBULATORY SURGERY CENTER | Age: 40
End: 2025-07-16

## 2025-07-16 ENCOUNTER — ANESTHESIA EVENT (OUTPATIENT)
Dept: ANESTHESIOLOGY | Facility: AMBULATORY SURGERY CENTER | Age: 40
End: 2025-07-16

## 2025-07-24 ENCOUNTER — ANESTHESIA (OUTPATIENT)
Dept: ANESTHESIOLOGY | Facility: AMBULATORY SURGERY CENTER | Age: 40
End: 2025-07-24

## 2025-07-24 ENCOUNTER — ANESTHESIA EVENT (OUTPATIENT)
Dept: ANESTHESIOLOGY | Facility: AMBULATORY SURGERY CENTER | Age: 40
End: 2025-07-24

## 2025-07-30 ENCOUNTER — ANESTHESIA EVENT (OUTPATIENT)
Dept: GASTROENTEROLOGY | Facility: AMBULATORY SURGERY CENTER | Age: 40
End: 2025-07-30

## 2025-07-30 ENCOUNTER — ANESTHESIA (OUTPATIENT)
Dept: GASTROENTEROLOGY | Facility: AMBULATORY SURGERY CENTER | Age: 40
End: 2025-07-30

## 2025-07-30 ENCOUNTER — HOSPITAL ENCOUNTER (OUTPATIENT)
Dept: GASTROENTEROLOGY | Facility: AMBULATORY SURGERY CENTER | Age: 40
Setting detail: OUTPATIENT SURGERY
Discharge: HOME | End: 2025-07-30
Attending: STUDENT IN AN ORGANIZED HEALTH CARE EDUCATION/TRAINING PROGRAM
Payer: COMMERCIAL

## 2025-07-30 VITALS
DIASTOLIC BLOOD PRESSURE: 61 MMHG | RESPIRATION RATE: 16 BRPM | HEIGHT: 68 IN | HEART RATE: 81 BPM | TEMPERATURE: 97.4 F | BODY MASS INDEX: 38.95 KG/M2 | WEIGHT: 257 LBS | OXYGEN SATURATION: 94 % | SYSTOLIC BLOOD PRESSURE: 128 MMHG

## 2025-07-30 DIAGNOSIS — K21.9 GASTROESOPHAGEAL REFLUX DISEASE WITHOUT ESOPHAGITIS: ICD-10-CM

## 2025-07-30 PROCEDURE — 43239 EGD BIOPSY SINGLE/MULTIPLE: CPT | Performed by: STUDENT IN AN ORGANIZED HEALTH CARE EDUCATION/TRAINING PROGRAM

## 2025-07-30 PROCEDURE — 88305 TISSUE EXAM BY PATHOLOGIST: CPT | Performed by: PATHOLOGY

## 2025-07-30 RX ORDER — SODIUM CHLORIDE, SODIUM LACTATE, POTASSIUM CHLORIDE, CALCIUM CHLORIDE 600; 310; 30; 20 MG/100ML; MG/100ML; MG/100ML; MG/100ML
INJECTION, SOLUTION INTRAVENOUS CONTINUOUS PRN
Status: DISCONTINUED | OUTPATIENT
Start: 2025-07-30 | End: 2025-07-30

## 2025-07-30 RX ORDER — PROPOFOL 10 MG/ML
INJECTION, EMULSION INTRAVENOUS AS NEEDED
Status: DISCONTINUED | OUTPATIENT
Start: 2025-07-30 | End: 2025-07-30

## 2025-07-30 RX ORDER — SODIUM CHLORIDE, SODIUM LACTATE, POTASSIUM CHLORIDE, CALCIUM CHLORIDE 600; 310; 30; 20 MG/100ML; MG/100ML; MG/100ML; MG/100ML
125 INJECTION, SOLUTION INTRAVENOUS CONTINUOUS
Status: DISCONTINUED | OUTPATIENT
Start: 2025-07-30 | End: 2025-08-03 | Stop reason: HOSPADM

## 2025-07-30 RX ADMIN — PROPOFOL 300 MG: 10 INJECTION, EMULSION INTRAVENOUS at 11:10

## 2025-07-30 RX ADMIN — SODIUM CHLORIDE, SODIUM LACTATE, POTASSIUM CHLORIDE, CALCIUM CHLORIDE: 600; 310; 30; 20 INJECTION, SOLUTION INTRAVENOUS at 11:06

## 2025-07-30 RX ADMIN — SODIUM CHLORIDE, SODIUM LACTATE, POTASSIUM CHLORIDE, CALCIUM CHLORIDE 125 ML/HR: 600; 310; 30; 20 INJECTION, SOLUTION INTRAVENOUS at 10:47

## 2025-08-05 ENCOUNTER — TELEPHONE (OUTPATIENT)
Dept: GASTROENTEROLOGY | Facility: CLINIC | Age: 40
End: 2025-08-05